# Patient Record
Sex: MALE | Race: WHITE | NOT HISPANIC OR LATINO | Employment: FULL TIME | URBAN - METROPOLITAN AREA
[De-identification: names, ages, dates, MRNs, and addresses within clinical notes are randomized per-mention and may not be internally consistent; named-entity substitution may affect disease eponyms.]

---

## 2018-04-07 ENCOUNTER — HOSPITAL ENCOUNTER (INPATIENT)
Facility: HOSPITAL | Age: 28
LOS: 2 days | Discharge: HOME | End: 2018-04-10
Attending: EMERGENCY MEDICINE | Admitting: ORTHOPAEDIC SURGERY

## 2018-04-07 DIAGNOSIS — S82.401A CLOSED FRACTURE OF RIGHT TIBIA AND FIBULA, INITIAL ENCOUNTER: Primary | ICD-10-CM

## 2018-04-07 DIAGNOSIS — S82.201A CLOSED FRACTURE OF RIGHT TIBIA AND FIBULA, INITIAL ENCOUNTER: Primary | ICD-10-CM

## 2018-04-07 PROCEDURE — 99284 EMERGENCY DEPT VISIT MOD MDM: CPT

## 2018-04-08 ENCOUNTER — APPOINTMENT (OUTPATIENT)
Dept: GENERAL RADIOLOGY | Facility: HOSPITAL | Age: 28
End: 2018-04-08

## 2018-04-08 ENCOUNTER — ANESTHESIA EVENT (OUTPATIENT)
Dept: PERIOP | Facility: HOSPITAL | Age: 28
End: 2018-04-08

## 2018-04-08 ENCOUNTER — ANESTHESIA (OUTPATIENT)
Dept: PERIOP | Facility: HOSPITAL | Age: 28
End: 2018-04-08

## 2018-04-08 ENCOUNTER — PREP FOR SURGERY (OUTPATIENT)
Dept: OTHER | Facility: HOSPITAL | Age: 28
End: 2018-04-08

## 2018-04-08 DIAGNOSIS — S82.201A CLOSED FRACTURE OF RIGHT TIBIA AND FIBULA, INITIAL ENCOUNTER: Primary | ICD-10-CM

## 2018-04-08 DIAGNOSIS — S82.401A CLOSED FRACTURE OF RIGHT TIBIA AND FIBULA, INITIAL ENCOUNTER: Primary | ICD-10-CM

## 2018-04-08 LAB
ANION GAP SERPL CALCULATED.3IONS-SCNC: 14.1 MMOL/L
BASOPHILS # BLD AUTO: 0.04 10*3/MM3 (ref 0–0.2)
BASOPHILS NFR BLD AUTO: 0.3 % (ref 0–2)
BUN BLD-MCNC: 12 MG/DL (ref 6–20)
BUN/CREAT SERPL: 12.2 (ref 7–25)
CALCIUM SPEC-SCNC: 9.3 MG/DL (ref 8.6–10.5)
CHLORIDE SERPL-SCNC: 103 MMOL/L (ref 98–107)
CO2 SERPL-SCNC: 25.9 MMOL/L (ref 22–29)
CREAT BLD-MCNC: 0.98 MG/DL (ref 0.76–1.27)
DEPRECATED RDW RBC AUTO: 37.3 FL (ref 37–54)
EOSINOPHIL # BLD AUTO: 0.01 10*3/MM3 (ref 0.1–0.3)
EOSINOPHIL NFR BLD AUTO: 0.1 % (ref 0–4)
ERYTHROCYTE [DISTWIDTH] IN BLOOD BY AUTOMATED COUNT: 12.8 % (ref 11.5–14.5)
ETHANOL BLD-MCNC: 199 MG/DL
ETHANOL UR QL: 0.2 %
GFR SERPL CREATININE-BSD FRML MDRD: 90 ML/MIN/1.73
GLUCOSE BLD-MCNC: 133 MG/DL (ref 65–99)
HCT VFR BLD AUTO: 47.1 % (ref 42–52)
HGB BLD-MCNC: 16.1 G/DL (ref 14–18)
IMM GRANULOCYTES # BLD: 0.04 10*3/MM3 (ref 0–0.03)
IMM GRANULOCYTES NFR BLD: 0.3 % (ref 0–0.5)
LYMPHOCYTES # BLD AUTO: 1.69 10*3/MM3 (ref 0.6–4.8)
LYMPHOCYTES NFR BLD AUTO: 14.5 % (ref 20–45)
MCH RBC QN AUTO: 27.8 PG (ref 27–31)
MCHC RBC AUTO-ENTMCNC: 34.2 G/DL (ref 31–37)
MCV RBC AUTO: 81.3 FL (ref 80–94)
MONOCYTES # BLD AUTO: 0.61 10*3/MM3 (ref 0–1)
MONOCYTES NFR BLD AUTO: 5.2 % (ref 3–8)
NEUTROPHILS # BLD AUTO: 9.27 10*3/MM3 (ref 1.5–8.3)
NEUTROPHILS NFR BLD AUTO: 79.6 % (ref 45–70)
NRBC BLD MANUAL-RTO: 0 /100 WBC (ref 0–0)
PLATELET # BLD AUTO: 268 10*3/MM3 (ref 140–500)
PMV BLD AUTO: 11.3 FL (ref 7.4–10.4)
POTASSIUM BLD-SCNC: 4.1 MMOL/L (ref 3.5–5.2)
RBC # BLD AUTO: 5.79 10*6/MM3 (ref 4.7–6.1)
SODIUM BLD-SCNC: 143 MMOL/L (ref 136–145)
WBC NRBC COR # BLD: 11.66 10*3/MM3 (ref 4.8–10.8)

## 2018-04-08 PROCEDURE — 80048 BASIC METABOLIC PNL TOTAL CA: CPT | Performed by: EMERGENCY MEDICINE

## 2018-04-08 PROCEDURE — 76000 FLUOROSCOPY <1 HR PHYS/QHP: CPT

## 2018-04-08 PROCEDURE — 85025 COMPLETE CBC W/AUTO DIFF WBC: CPT | Performed by: EMERGENCY MEDICINE

## 2018-04-08 PROCEDURE — 80307 DRUG TEST PRSMV CHEM ANLYZR: CPT | Performed by: EMERGENCY MEDICINE

## 2018-04-08 PROCEDURE — 25010000002 ONDANSETRON PER 1 MG

## 2018-04-08 PROCEDURE — 99284 EMERGENCY DEPT VISIT MOD MDM: CPT | Performed by: EMERGENCY MEDICINE

## 2018-04-08 PROCEDURE — 25010000002 ROPIVACAINE PER 1 MG: Performed by: NURSE ANESTHETIST, CERTIFIED REGISTERED

## 2018-04-08 PROCEDURE — 25010000002 VANCOMYCIN PER 500 MG: Performed by: ORTHOPAEDIC SURGERY

## 2018-04-08 PROCEDURE — 25010000002 FENTANYL CITRATE (PF) 100 MCG/2ML SOLUTION: Performed by: NURSE ANESTHETIST, CERTIFIED REGISTERED

## 2018-04-08 PROCEDURE — 73590 X-RAY EXAM OF LOWER LEG: CPT

## 2018-04-08 PROCEDURE — 27759 TREATMENT OF TIBIA FRACTURE: CPT | Performed by: ORTHOPAEDIC SURGERY

## 2018-04-08 PROCEDURE — C1713 ANCHOR/SCREW BN/BN,TIS/BN: HCPCS | Performed by: ORTHOPAEDIC SURGERY

## 2018-04-08 PROCEDURE — 0QSG06Z REPOSITION RIGHT TIBIA WITH INTRAMEDULLARY INTERNAL FIXATION DEVICE, OPEN APPROACH: ICD-10-PCS | Performed by: ORTHOPAEDIC SURGERY

## 2018-04-08 PROCEDURE — 25010000002 ONDANSETRON PER 1 MG: Performed by: NURSE ANESTHETIST, CERTIFIED REGISTERED

## 2018-04-08 PROCEDURE — 25010000002 MIDAZOLAM PER 1 MG: Performed by: NURSE ANESTHETIST, CERTIFIED REGISTERED

## 2018-04-08 PROCEDURE — 25010000002 DEXAMETHASONE PER 1 MG: Performed by: NURSE ANESTHETIST, CERTIFIED REGISTERED

## 2018-04-08 PROCEDURE — 25010000002 VANCOMYCIN 1500 MG/250ML SOLUTION: Performed by: ORTHOPAEDIC SURGERY

## 2018-04-08 PROCEDURE — 71045 X-RAY EXAM CHEST 1 VIEW: CPT

## 2018-04-08 PROCEDURE — 25010000002 HYDROMORPHONE PER 4 MG

## 2018-04-08 PROCEDURE — 25010000002 PROPOFOL 10 MG/ML EMULSION: Performed by: NURSE ANESTHETIST, CERTIFIED REGISTERED

## 2018-04-08 PROCEDURE — S0260 H&P FOR SURGERY: HCPCS | Performed by: ORTHOPAEDIC SURGERY

## 2018-04-08 PROCEDURE — 25010000002 HYDROMORPHONE PER 4 MG: Performed by: ORTHOPAEDIC SURGERY

## 2018-04-08 PROCEDURE — 94799 UNLISTED PULMONARY SVC/PX: CPT

## 2018-04-08 DEVICE — LOCKING SCREW, FULLY THREADED: Type: IMPLANTABLE DEVICE | Site: TIBIA | Status: FUNCTIONAL

## 2018-04-08 DEVICE — TIBIAL NAIL, STANDARD
Type: IMPLANTABLE DEVICE | Site: TIBIA | Status: FUNCTIONAL
Brand: T2

## 2018-04-08 DEVICE — END CAP
Type: IMPLANTABLE DEVICE | Site: TIBIA | Status: FUNCTIONAL
Brand: T2

## 2018-04-08 RX ORDER — MIDAZOLAM HYDROCHLORIDE 1 MG/ML
1 INJECTION INTRAMUSCULAR; INTRAVENOUS
Status: DISCONTINUED | OUTPATIENT
Start: 2018-04-08 | End: 2018-04-08 | Stop reason: HOSPADM

## 2018-04-08 RX ORDER — OXYCODONE HCL 10 MG/1
10 TABLET, FILM COATED, EXTENDED RELEASE ORAL ONCE
Status: CANCELLED | OUTPATIENT
Start: 2018-04-08 | End: 2018-04-08

## 2018-04-08 RX ORDER — FENTANYL CITRATE 50 UG/ML
INJECTION, SOLUTION INTRAMUSCULAR; INTRAVENOUS AS NEEDED
Status: DISCONTINUED | OUTPATIENT
Start: 2018-04-08 | End: 2018-04-08 | Stop reason: SURG

## 2018-04-08 RX ORDER — LIDOCAINE HYDROCHLORIDE 10 MG/ML
0.5 INJECTION, SOLUTION EPIDURAL; INFILTRATION; INTRACAUDAL; PERINEURAL ONCE AS NEEDED
Status: DISCONTINUED | OUTPATIENT
Start: 2018-04-08 | End: 2018-04-08 | Stop reason: HOSPADM

## 2018-04-08 RX ORDER — ACETAMINOPHEN 500 MG
1000 TABLET ORAL ONCE
Status: COMPLETED | OUTPATIENT
Start: 2018-04-08 | End: 2018-04-08

## 2018-04-08 RX ORDER — SODIUM CHLORIDE, SODIUM LACTATE, POTASSIUM CHLORIDE, CALCIUM CHLORIDE 600; 310; 30; 20 MG/100ML; MG/100ML; MG/100ML; MG/100ML
100 INJECTION, SOLUTION INTRAVENOUS CONTINUOUS
Status: DISCONTINUED | OUTPATIENT
Start: 2018-04-08 | End: 2018-04-10 | Stop reason: HOSPADM

## 2018-04-08 RX ORDER — ONDANSETRON 2 MG/ML
INJECTION INTRAMUSCULAR; INTRAVENOUS
Status: COMPLETED
Start: 2018-04-08 | End: 2018-04-08

## 2018-04-08 RX ORDER — ONDANSETRON 2 MG/ML
4 INJECTION INTRAMUSCULAR; INTRAVENOUS ONCE
Status: COMPLETED | OUTPATIENT
Start: 2018-04-08 | End: 2018-04-08

## 2018-04-08 RX ORDER — SODIUM CHLORIDE 0.9 % (FLUSH) 0.9 %
1-10 SYRINGE (ML) INJECTION AS NEEDED
Status: DISCONTINUED | OUTPATIENT
Start: 2018-04-08 | End: 2018-04-08 | Stop reason: HOSPADM

## 2018-04-08 RX ORDER — OXYCODONE HYDROCHLORIDE AND ACETAMINOPHEN 5; 325 MG/1; MG/1
1 TABLET ORAL ONCE AS NEEDED
Status: DISCONTINUED | OUTPATIENT
Start: 2018-04-08 | End: 2018-04-09

## 2018-04-08 RX ORDER — PREGABALIN 75 MG/1
75 CAPSULE ORAL EVERY 12 HOURS SCHEDULED
Status: DISCONTINUED | OUTPATIENT
Start: 2018-04-08 | End: 2018-04-09

## 2018-04-08 RX ORDER — ONDANSETRON 2 MG/ML
4 INJECTION INTRAMUSCULAR; INTRAVENOUS ONCE AS NEEDED
Status: DISCONTINUED | OUTPATIENT
Start: 2018-04-08 | End: 2018-04-10 | Stop reason: HOSPADM

## 2018-04-08 RX ORDER — VANCOMYCIN HCL-SODIUM CHLORIDE IV SOLN 1.5 GM/250ML-0.9% 1.5-0.9/25 GM/ML-%
1500 SOLUTION INTRAVENOUS ONCE
Status: CANCELLED | OUTPATIENT
Start: 2018-04-08 | End: 2018-04-08

## 2018-04-08 RX ORDER — ONDANSETRON 2 MG/ML
4 INJECTION INTRAMUSCULAR; INTRAVENOUS ONCE AS NEEDED
Status: COMPLETED | OUTPATIENT
Start: 2018-04-08 | End: 2018-04-08

## 2018-04-08 RX ORDER — PREGABALIN 75 MG/1
150 CAPSULE ORAL ONCE
Status: COMPLETED | OUTPATIENT
Start: 2018-04-08 | End: 2018-04-08

## 2018-04-08 RX ORDER — VANCOMYCIN HCL-SODIUM CHLORIDE IV SOLN 1.5 GM/250ML-0.9% 1.5-0.9/25 GM/ML-%
1500 SOLUTION INTRAVENOUS ONCE
Status: COMPLETED | OUTPATIENT
Start: 2018-04-08 | End: 2018-04-08

## 2018-04-08 RX ORDER — OXYCODONE AND ACETAMINOPHEN 10; 325 MG/1; MG/1
1 TABLET ORAL EVERY 4 HOURS PRN
Status: DISCONTINUED | OUTPATIENT
Start: 2018-04-08 | End: 2018-04-09

## 2018-04-08 RX ORDER — MIDAZOLAM HYDROCHLORIDE 1 MG/ML
2 INJECTION INTRAMUSCULAR; INTRAVENOUS
Status: DISCONTINUED | OUTPATIENT
Start: 2018-04-08 | End: 2018-04-08 | Stop reason: HOSPADM

## 2018-04-08 RX ORDER — SODIUM CHLORIDE 9 MG/ML
INJECTION, SOLUTION INTRAVENOUS AS NEEDED
Status: DISCONTINUED | OUTPATIENT
Start: 2018-04-08 | End: 2018-04-08 | Stop reason: HOSPADM

## 2018-04-08 RX ORDER — PREGABALIN 75 MG/1
150 CAPSULE ORAL ONCE
Status: CANCELLED | OUTPATIENT
Start: 2018-04-08 | End: 2018-04-08

## 2018-04-08 RX ORDER — SODIUM CHLORIDE, SODIUM LACTATE, POTASSIUM CHLORIDE, CALCIUM CHLORIDE 600; 310; 30; 20 MG/100ML; MG/100ML; MG/100ML; MG/100ML
30 INJECTION, SOLUTION INTRAVENOUS CONTINUOUS
Status: DISCONTINUED | OUTPATIENT
Start: 2018-04-08 | End: 2018-04-08

## 2018-04-08 RX ORDER — BUPIVACAINE HYDROCHLORIDE 5 MG/ML
INJECTION, SOLUTION EPIDURAL; INTRACAUDAL AS NEEDED
Status: DISCONTINUED | OUTPATIENT
Start: 2018-04-08 | End: 2018-04-08 | Stop reason: SURG

## 2018-04-08 RX ORDER — HYDROMORPHONE HCL 110MG/55ML
1 PATIENT CONTROLLED ANALGESIA SYRINGE INTRAVENOUS
Status: DISCONTINUED | OUTPATIENT
Start: 2018-04-08 | End: 2018-04-10 | Stop reason: HOSPADM

## 2018-04-08 RX ORDER — ROPIVACAINE HYDROCHLORIDE 5 MG/ML
INJECTION, SOLUTION EPIDURAL; INFILTRATION; PERINEURAL AS NEEDED
Status: DISCONTINUED | OUTPATIENT
Start: 2018-04-08 | End: 2018-04-08 | Stop reason: SURG

## 2018-04-08 RX ORDER — DEXAMETHASONE SODIUM PHOSPHATE 4 MG/ML
8 INJECTION, SOLUTION INTRA-ARTICULAR; INTRALESIONAL; INTRAMUSCULAR; INTRAVENOUS; SOFT TISSUE ONCE AS NEEDED
Status: COMPLETED | OUTPATIENT
Start: 2018-04-08 | End: 2018-04-08

## 2018-04-08 RX ORDER — OXYCODONE HCL 10 MG/1
10 TABLET, FILM COATED, EXTENDED RELEASE ORAL ONCE
Status: DISCONTINUED | OUTPATIENT
Start: 2018-04-08 | End: 2018-04-08

## 2018-04-08 RX ORDER — FAMOTIDINE 10 MG/ML
20 INJECTION, SOLUTION INTRAVENOUS
Status: DISCONTINUED | OUTPATIENT
Start: 2018-04-08 | End: 2018-04-08 | Stop reason: HOSPADM

## 2018-04-08 RX ORDER — MEPERIDINE HYDROCHLORIDE 25 MG/ML
12.5 INJECTION INTRAMUSCULAR; INTRAVENOUS; SUBCUTANEOUS
Status: ACTIVE | OUTPATIENT
Start: 2018-04-08 | End: 2018-04-09

## 2018-04-08 RX ORDER — ACETAMINOPHEN 500 MG
1000 TABLET ORAL ONCE
Status: CANCELLED | OUTPATIENT
Start: 2018-04-08 | End: 2018-04-08

## 2018-04-08 RX ORDER — PROPOFOL 10 MG/ML
VIAL (ML) INTRAVENOUS CONTINUOUS PRN
Status: DISCONTINUED | OUTPATIENT
Start: 2018-04-08 | End: 2018-04-08 | Stop reason: SURG

## 2018-04-08 RX ORDER — SODIUM CHLORIDE 9 MG/ML
40 INJECTION, SOLUTION INTRAVENOUS AS NEEDED
Status: DISCONTINUED | OUTPATIENT
Start: 2018-04-08 | End: 2018-04-08 | Stop reason: HOSPADM

## 2018-04-08 RX ORDER — VANCOMYCIN HYDROCHLORIDE 500 MG/10ML
INJECTION, POWDER, LYOPHILIZED, FOR SOLUTION INTRAVENOUS AS NEEDED
Status: DISCONTINUED | OUTPATIENT
Start: 2018-04-08 | End: 2018-04-08 | Stop reason: HOSPADM

## 2018-04-08 RX ORDER — MAGNESIUM HYDROXIDE 1200 MG/15ML
LIQUID ORAL AS NEEDED
Status: DISCONTINUED | OUTPATIENT
Start: 2018-04-08 | End: 2018-04-08 | Stop reason: HOSPADM

## 2018-04-08 RX ORDER — LIDOCAINE HYDROCHLORIDE 20 MG/ML
INJECTION, SOLUTION INFILTRATION; PERINEURAL AS NEEDED
Status: DISCONTINUED | OUTPATIENT
Start: 2018-04-08 | End: 2018-04-08 | Stop reason: SURG

## 2018-04-08 RX ORDER — SODIUM CHLORIDE, SODIUM LACTATE, POTASSIUM CHLORIDE, CALCIUM CHLORIDE 600; 310; 30; 20 MG/100ML; MG/100ML; MG/100ML; MG/100ML
9 INJECTION, SOLUTION INTRAVENOUS CONTINUOUS
Status: DISCONTINUED | OUTPATIENT
Start: 2018-04-08 | End: 2018-04-08

## 2018-04-08 RX ADMIN — SODIUM CHLORIDE, POTASSIUM CHLORIDE, SODIUM LACTATE AND CALCIUM CHLORIDE 100 ML/HR: 600; 310; 30; 20 INJECTION, SOLUTION INTRAVENOUS at 13:11

## 2018-04-08 RX ADMIN — HYDROMORPHONE HYDROCHLORIDE 1 MG: 2 INJECTION INTRAMUSCULAR; INTRAVENOUS; SUBCUTANEOUS at 21:27

## 2018-04-08 RX ADMIN — SODIUM CHLORIDE, POTASSIUM CHLORIDE, SODIUM LACTATE AND CALCIUM CHLORIDE 9 ML/HR: 600; 310; 30; 20 INJECTION, SOLUTION INTRAVENOUS at 09:45

## 2018-04-08 RX ADMIN — SODIUM CHLORIDE, POTASSIUM CHLORIDE, SODIUM LACTATE AND CALCIUM CHLORIDE: 600; 310; 30; 20 INJECTION, SOLUTION INTRAVENOUS at 09:59

## 2018-04-08 RX ADMIN — SODIUM CHLORIDE, POTASSIUM CHLORIDE, SODIUM LACTATE AND CALCIUM CHLORIDE 100 ML/HR: 600; 310; 30; 20 INJECTION, SOLUTION INTRAVENOUS at 21:32

## 2018-04-08 RX ADMIN — HYDROMORPHONE HYDROCHLORIDE 1 MG: 2 INJECTION INTRAMUSCULAR; INTRAVENOUS; SUBCUTANEOUS at 09:37

## 2018-04-08 RX ADMIN — BUPIVACAINE HYDROCHLORIDE 3 MG: 5 INJECTION, SOLUTION EPIDURAL; INTRACAUDAL; PERINEURAL at 10:30

## 2018-04-08 RX ADMIN — PREGABALIN 75 MG: 75 CAPSULE ORAL at 20:17

## 2018-04-08 RX ADMIN — HYDROMORPHONE HYDROCHLORIDE 1 MG: 2 INJECTION INTRAMUSCULAR; INTRAVENOUS; SUBCUTANEOUS at 06:19

## 2018-04-08 RX ADMIN — SODIUM CHLORIDE, POTASSIUM CHLORIDE, SODIUM LACTATE AND CALCIUM CHLORIDE 30 ML/HR: 600; 310; 30; 20 INJECTION, SOLUTION INTRAVENOUS at 02:33

## 2018-04-08 RX ADMIN — MIDAZOLAM HYDROCHLORIDE 2 MG: 1 INJECTION, SOLUTION INTRAMUSCULAR; INTRAVENOUS at 10:25

## 2018-04-08 RX ADMIN — HYDROMORPHONE HYDROCHLORIDE 1 MG: 2 INJECTION INTRAMUSCULAR; INTRAVENOUS; SUBCUTANEOUS at 02:31

## 2018-04-08 RX ADMIN — FENTANYL CITRATE 50 MCG: 50 INJECTION, SOLUTION INTRAMUSCULAR; INTRAVENOUS at 10:30

## 2018-04-08 RX ADMIN — HYDROMORPHONE HYDROCHLORIDE 1 MG: 1 INJECTION, SOLUTION INTRAMUSCULAR; INTRAVENOUS; SUBCUTANEOUS at 01:13

## 2018-04-08 RX ADMIN — MIDAZOLAM HYDROCHLORIDE 2 MG: 1 INJECTION, SOLUTION INTRAMUSCULAR; INTRAVENOUS at 10:18

## 2018-04-08 RX ADMIN — PREGABALIN 150 MG: 75 CAPSULE ORAL at 10:01

## 2018-04-08 RX ADMIN — PROPOFOL 100 MCG/KG/MIN: 10 INJECTION, EMULSION INTRAVENOUS at 10:44

## 2018-04-08 RX ADMIN — OXYCODONE AND ACETAMINOPHEN 1 TABLET: 10; 325 TABLET ORAL at 15:12

## 2018-04-08 RX ADMIN — VANCOMYCIN HCL-SODIUM CHLORIDE IV SOLN 1.5 GM/250ML-0.9% 1500 MG: 1.5-0.9/25 SOLUTION at 09:45

## 2018-04-08 RX ADMIN — Medication 1 MG: at 01:13

## 2018-04-08 RX ADMIN — PREGABALIN 75 MG: 75 CAPSULE ORAL at 15:01

## 2018-04-08 RX ADMIN — HYDROMORPHONE HYDROCHLORIDE 1 MG: 2 INJECTION INTRAMUSCULAR; INTRAVENOUS; SUBCUTANEOUS at 18:36

## 2018-04-08 RX ADMIN — ROPIVACAINE HYDROCHLORIDE 1000 MG: 5 INJECTION, SOLUTION EPIDURAL; INFILTRATION; PERINEURAL at 10:20

## 2018-04-08 RX ADMIN — LIDOCAINE HYDROCHLORIDE 100 MG: 20 INJECTION, SOLUTION INFILTRATION; PERINEURAL at 10:44

## 2018-04-08 RX ADMIN — ONDANSETRON 4 MG: 2 SOLUTION INTRAMUSCULAR; INTRAVENOUS at 01:13

## 2018-04-08 RX ADMIN — ACETAMINOPHEN 1000 MG: 500 TABLET, FILM COATED ORAL at 10:02

## 2018-04-08 RX ADMIN — ONDANSETRON 4 MG: 2 INJECTION, SOLUTION INTRAMUSCULAR; INTRAVENOUS at 11:16

## 2018-04-08 RX ADMIN — ONDANSETRON 4 MG: 2 INJECTION INTRAMUSCULAR; INTRAVENOUS at 01:13

## 2018-04-08 RX ADMIN — DEXAMETHASONE SODIUM PHOSPHATE 8 MG: 4 INJECTION, SOLUTION INTRAMUSCULAR; INTRAVENOUS at 10:16

## 2018-04-08 NOTE — PLAN OF CARE
Problem: Patient Care Overview  Goal: Discharge Needs Assessment  Outcome: Ongoing (interventions implemented as appropriate)   04/08/18 0158 04/08/18 1110 04/08/18 1115   Discharge Needs Assessment   Readmission Within the Last 30 Days --  no previous admission in last 30 days --    Concerns to be Addressed --  discharge planning --    Patient/Family Anticipates Transition to --  home with family --    Patient/Family Anticipated Services at Transition none --  --    Transportation Anticipated --  family or friend will provide --    Anticipated Changes Related to Illness --  (He has some concerns about his job and being non weight-bearing.) --    Equipment Needed After Discharge --  crutches --    Discharge Coordination/Progress --  --  Spoke with Mr Amaya and his wife (with permission) at bedside. They live in a house in [place]. He has not had home health in the past. His wife states they have crutches at home. Prior to this injury he was independent with his ADL's and drives himself. His pharmacy is [facility] in [place]. There are no financial issues with payinf for his prescriptions. He does not have an advanced directive. Facesheet verified. Will continue to follow. Plan is home when stable.   Disability   Equipment Currently Used at Home --  none --
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
## 2018-04-08 NOTE — PLAN OF CARE
Problem: Patient Care Overview  Goal: Plan of Care Review  Outcome: Ongoing (interventions implemented as appropriate)   04/08/18 0323   Coping/Psychosocial   Plan of Care Reviewed With patient   OTHER   Outcome Summary Pt. complains of pain - managed w/pain medication; RLE elevated w/ice; no falls noted this shift; NPO; surgical consent signed & in chart; IVF infusing     Goal: Individualization and Mutuality  Outcome: Ongoing (interventions implemented as appropriate)    Goal: Discharge Needs Assessment  Outcome: Ongoing (interventions implemented as appropriate)      Problem: Fall Risk (Adult)  Goal: Identify Related Risk Factors and Signs and Symptoms  Outcome: Ongoing (interventions implemented as appropriate)    Goal: Absence of Fall  Outcome: Ongoing (interventions implemented as appropriate)      Problem: Skin Injury Risk (Adult)  Goal: Identify Related Risk Factors and Signs and Symptoms  Outcome: Ongoing (interventions implemented as appropriate)    Goal: Skin Health and Integrity  Outcome: Ongoing (interventions implemented as appropriate)

## 2018-04-08 NOTE — ANESTHESIA PREPROCEDURE EVALUATION
Anesthesia Evaluation     Patient summary reviewed and Nursing notes reviewed   no history of anesthetic complications:  NPO Solid Status: > 8 hours  NPO Liquid Status: > 8 hours           Airway   Mallampati: II  TM distance: >3 FB  Neck ROM: full  No difficulty expected  Dental - normal exam     Pulmonary - negative pulmonary ROS and normal exam    breath sounds clear to auscultation  Cardiovascular - negative cardio ROS and normal exam  Exercise tolerance: excellent (>7 METS)    Rhythm: regular  Rate: normal        Neuro/Psych- negative ROS  GI/Hepatic/Renal/Endo - negative ROS     Musculoskeletal (-) negative ROS    Abdominal    Substance History   (+) alcohol use ( occasional),      OB/GYN negative ob/gyn ROS         Other                      Anesthesia Plan    ASA 1 - emergent     MAC, spinal and regional     intravenous induction   Anesthetic plan and risks discussed with patient.  Use of blood products discussed with patient  Consented to blood products.

## 2018-04-08 NOTE — ED NOTES
Pt and family know that he is to have nothing to eat or drink.     Dusty Harrison RN  04/08/18 0123
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
## 2018-04-08 NOTE — ANESTHESIA PROCEDURE NOTES
Spinal Block    Patient location during procedure: pre-op  Start Time: 4/8/2018 10:25 AM  Stop Time: 4/8/2018 10:30 AM  Indication:at surgeon's request and procedure for pain  Performed By  CRNA: RYNE ENCINAS  Preanesthetic Checklist  Completed: patient identified, site marked, surgical consent, pre-op evaluation, timeout performed, IV checked, risks and benefits discussed and monitors and equipment checked  Spinal Block Prep:  Patient Position:left lateral decubitus  Sterile Tech:cap, gloves, mask and sterile barriers  Prep:Betadine  Patient Monitoring:blood pressure monitoring, continuous pulse oximetry and EKG  Spinal Block Procedure  Approach:midline  Guidance:landmark technique and palpation technique  Location:L3-L4  Needle Type:Sprotte  Needle Gauge:24 G  Placement of Spinal needle event:cerebrospinal fluid aspirated  Paresthesia: no  Fluid Appearance:clear  Post Assessment  Patient Tolerance:patient tolerated the procedure well with no apparent complications  Complications no

## 2018-04-08 NOTE — PLAN OF CARE
Problem: Patient Care Overview  Goal: Plan of Care Review  Outcome: Ongoing (interventions implemented as appropriate)    Goal: Individualization and Mutuality  Outcome: Ongoing (interventions implemented as appropriate)    Goal: Discharge Needs Assessment  Outcome: Ongoing (interventions implemented as appropriate)    Goal: Interprofessional Rounds/Family Conf  Outcome: Ongoing (interventions implemented as appropriate)      Problem: Fall Risk (Adult)  Goal: Identify Related Risk Factors and Signs and Symptoms  Outcome: Outcome(s) achieved Date Met: 04/08/18    Goal: Absence of Fall  Outcome: Ongoing (interventions implemented as appropriate)      Problem: Skin Injury Risk (Adult)  Goal: Identify Related Risk Factors and Signs and Symptoms  Outcome: Outcome(s) achieved Date Met: 04/08/18    Goal: Skin Health and Integrity  Outcome: Ongoing (interventions implemented as appropriate)

## 2018-04-08 NOTE — ANESTHESIA PROCEDURE NOTES
Peripheral Block    Patient location during procedure: pre-op  Start time: 4/8/2018 10:17 AM  Stop time: 4/8/2018 10:21 AM  Reason for block: at surgeon's request and post-op pain management  Performed by  CRNA: RYNE ENCINAS  Preanesthetic Checklist  Completed: patient identified, site marked, surgical consent, pre-op evaluation, timeout performed, IV checked, risks and benefits discussed and monitors and equipment checked  Procedure  Sedation:yes  Performed under: PNB  Guidance:ultrasound guided  ULTRASOUND INTERPRETATION. Using ultrasound guidance a gauge needle was placed in close proximity to the femoral nerve, at which point, under ultrasound guidance anesthetic was injected in the area of the nerve and spread of the anesthesia was seen on ultrasound in close proximity thereto.  There were no abnormalities seen on ultrasound; a digital image was taken; and the patient tolerated the procedure with no complications. Images:still images obtained    Laterality:right  Block Type:adductor canal block  Injection Technique:single-shot  Needle Type:echogenic  Needle Gauge:22 G  Resistance on Injection: none  Medications  Local Injected:ropivacaine 0.5% Local Amount Injected:20mL  Post Assessment  Injection Assessment: negative aspiration for heme, no paresthesia on injection and incremental injection  Patient Tolerance:comfortable throughout block  Complications:no

## 2018-04-08 NOTE — NURSING NOTE
Discharge Planning Assessment   Lianne Epps     Patient Name: Derick Amaya  MRN: [id number]  Today's Date: 4/8/2018    Admit Date: 4/7/2018          Discharge Needs Assessment     Row Name 04/08/18 1115       Discharge Needs Assessment    Discharge Coordination/Progress Spoke with Mr Amaya and his wife (with permission) at bedside.  They live in a house in [place].  He has not had home health in the past.  His wife states they have crutches at home.  Prior to this injury he was independent with his ADL's and drives himself.  His pharmacy is [facility] in [place].  There are no financial issues with payinf for his prescriptions.  He does not have an advanced directive.  Facesheet verified.  Will continue to follow.  Plan is home when stable.    Row Name 04/08/18 1110       Living Environment    Lives With spouse    Name(s) of Who Lives With Patient Anika Amaya    Current Living Arrangements home/apartment/condo    Primary Care Provided by self    Provides Primary Care For no one    Family Caregiver if Needed spouse    Quality of Family Relationships helpful;involved;supportive    Able to Return to Prior Arrangements yes       Resource/Environmental Concerns    Resource/Environmental Concerns none       Transition Planning    Patient/Family Anticipates Transition to home with family    Transportation Anticipated family or friend will provide       Discharge Needs Assessment    Readmission Within the Last 30 Days no previous admission in last 30 days    Concerns to be Addressed discharge planning    Equipment Currently Used at Home none    Anticipated Changes Related to Illness --   He has some concerns about his job and being non weight-bearing.    Equipment Needed After Discharge crutches            Discharge Plan     Row Name 04/08/18 1120       Plan    Plan Comments Spoke with Mr Amaya and his wife (with permission) at bedside.  They live in a house in [place].  He has not had home health in the  past.  His wife states they have crutches at home.  Prior to this injury he was independent with his ADL's and drives himself.  His pharmacy is [facility] in [place].  There are no financial issues with payinf for his prescriptions.  He does not have an advanced directive.  Facesheet verified.  Will continue to follow.  Plan is home when stable.        Destination     No service coordination in this encounter.      Durable Medical Equipment     No service coordination in this encounter.      Dialysis/Infusion     No service coordination in this encounter.      Home Medical Care     No service coordination in this encounter.      Social Care     No service coordination in this encounter.                Demographic Summary     Row Name 04/08/18 1110       General Information    Arrived From home    Referral Source admission list    Preferred Language English     Used During This Interaction no       Contact Information    Permission Granted to Share Info With             Functional Status    No documentation.           Psychosocial    No documentation.           Abuse/Neglect    No documentation.           Legal    No documentation.           Substance Abuse    No documentation.           Patient Forms    No documentation.         Aissatou Stock RN

## 2018-04-08 NOTE — H&P
"Orthopedic Surgery    Patient Care Team:  MICHAEL Flores as PCP - General (Physician Assistant)    CHIEF COMPLAINT: Right leg pain    HISTORY OF PRESENT ILLNESS: 27-year-old male is admitted to the emergency room last night after having fallen at a concert injuring his right leg when someone fell on him.  Patient is a little nebulous as to the specifics of the injury and presented to the emergency room with severe pain involving the right leg.  He does report drinking beer and hard liquor low doesn't recall the amount.  X-rays done on admission shows a fracture of the right proximal fibula and oblique fracture of the distal one third of the tibia with displacement.  No other apparent injuries as result of this fall.  Patient admitted this time for definitive care.          History reviewed. No pertinent past medical history.  Past Surgical History:   Procedure Laterality Date   • APPENDECTOMY     • VASECTOMY       Family History   Problem Relation Age of Onset   • No Known Problems Mother    • No Known Problems Father      Social History   Substance Use Topics   • Smoking status: Never Smoker   • Smokeless tobacco: Never Used   • Alcohol use Yes      Comment: social drinker     No prescriptions prior to admission.     Allergies:  Augmentin [amoxicillin-pot clavulanate]    REVIEW OF SYSTEMS:  Please see the above history of present illness for pertinent positives and negatives.  The remainder of the patient's systems have been reviewed and are negative.    Vital Signs  Temp:  [98.1 °F (36.7 °C)-99.1 °F (37.3 °C)] 99.1 °F (37.3 °C)  Heart Rate:  [] 83  Resp:  [16-18] 18  BP: (106-138)/(68-85) 106/68    Flowsheet Rows    Flowsheet Row First Filed Value   Admission Height 175.3 cm (69\") Documented at 04/07/2018 2355   Admission Weight 95.3 kg (210 lb) Documented at 04/07/2018 2355           Physical Exam:  Physical Exam   Constitutional: Patient appears well-developed and well-nourished and in no acute " distress   HEENT:   Head: Normocephalic and atraumatic.   Eyes:  Pupils are equal, round, and reactive to light.  Mouth and Throat: Patient has moist mucous membranes. Oropharynx is clear of any erythema or exudate.     Neck: Neck supple. No JVD present. No thyromegaly present. No lymphadenopathy present.  Cardiovascular: Regular rate, regular rhythm.  Pulmonary/Chest: Lungs are clear to auscultation bilaterally.  Abdominal:benign,soft with bowel sounds  Musculoskeletal: Normal posture.  Extremities: The right leg is in a short-leg splint.  He's having moderate pain as expected.  His no sensory loss in his good capillary refill.    Neurological: Patient is alert and oriented.  Psychological:   Mood and behavior appropriate.  Skin: Skin is warm and dry.     Results Review:    I reviewed the patient's new clinical results.  Lab Results (most recent)     Procedure Component Value Units Date/Time    Basic Metabolic Panel [[id number]]  (Abnormal) Collected:  04/08/18 0108    Specimen:  Blood Updated:  04/08/18 0127     Glucose 133 (H) mg/dL      BUN 12 mg/dL      Creatinine 0.98 mg/dL      Sodium 143 mmol/L      Potassium 4.1 mmol/L      Chloride 103 mmol/L      CO2 25.9 mmol/L      Calcium 9.3 mg/dL      eGFR Non African Amer 90 mL/min/1.73      BUN/Creatinine Ratio 12.2     Anion Gap 14.1 mmol/L     Narrative:       GFR Normal >60  Chronic Kidney Disease <60  Kidney Failure <15    Ethanol [[id number]] Collected:  04/08/18 0108    Specimen:  Blood Updated:  04/08/18 0127     Ethanol 199 mg/dL      Ethanol % 0.199 %     CBC & Differential [[id number]] Collected:  04/08/18 0108    Specimen:  Blood Updated:  04/08/18 0112    Narrative:       The following orders were created for panel order CBC & Differential.  Procedure                               Abnormality         Status                     ---------                               -----------         ------                     CBC Auto Differential[[id number]]          Abnormal            Final result                 Please view results for these tests on the individual orders.    CBC Auto Differential [[id number]]  (Abnormal) Collected:  04/08/18 0108    Specimen:  Blood Updated:  04/08/18 0112     WBC 11.66 (H) 10*3/mm3      RBC 5.79 10*6/mm3      Hemoglobin 16.1 g/dL      Hematocrit 47.1 %      MCV 81.3 fL      MCH 27.8 pg      MCHC 34.2 g/dL      RDW 12.8 %      RDW-SD 37.3 fl      MPV 11.3 (H) fL      Platelets 268 10*3/mm3      Neutrophil % 79.6 (H) %      Lymphocyte % 14.5 (L) %      Monocyte % 5.2 %      Eosinophil % 0.1 %      Basophil % 0.3 %      Immature Grans % 0.3 %      Neutrophils, Absolute 9.27 (H) 10*3/mm3      Lymphocytes, Absolute 1.69 10*3/mm3      Monocytes, Absolute 0.61 10*3/mm3      Eosinophils, Absolute 0.01 (L) 10*3/mm3      Basophils, Absolute 0.04 10*3/mm3      Immature Grans, Absolute 0.04 (H) 10*3/mm3      nRBC 0.0 /100 WBC           Imaging Results (most recent)     Procedure Component Value Units Date/Time    XR Chest 1 View [[id number]] Collected:  04/08/18 0731     Updated:  04/08/18 0734    Narrative:       EXAM: Portable chest radiograph     HISTORY: Preop exam prior to ORIF right tibial and fibular fractures     TECHNIQUE: Single view portable chest radiograph      COMPARISON: None     FINDINGS: Allowing for apparent submaximal inspiration, there is no  infiltrate, effusion, pneumothorax or suspicious nodule. Heart size and  pulmonary vascularity and bony structures are normal.       Impression:       Submaximal inspiration, likely negative portable chest  radiograph.     This report was finalized on 4/8/2018 7:32 AM by Dr. Marcio Pompa MD.       XR Tibia Fibula 2 View Right [[id number]] Collected:  04/08/18 0729     Updated:  04/08/18 0733    Narrative:       INDICATION: Acute right leg pain after fall immediately prior to  arrival.     COMPARISON: None available.     FINDINGS: 5 views of the right tibia/fibula.  There is no oblique  distal  tibial fracture a few centimeters above the plafond, and an oblique  fracture of the fibular neck.       No bone erosion or destruction. Articulation at the knee and ankle is  anatomic..  No foreign body.       Impression:       Oblique distal tibial and proximal fibular fractures mildly to  moderately displaced.     This report was finalized on 4/8/2018 7:31 AM by Dr. Marcio Pompa MD.                 ECG/EMG Results (most recent)     None              Assessment/Plan x-ray shows a minimally to nondisplaced right proximal fibular fracture and a displaced right distal one third tibia fracture        I discussed the patients findings and my recommendations with patient and reviewed the x-rays with the patient.  Discussed surgical intervention with intramedullary nailing.  Reviewed the risk of surgery which include but not limited to infection with the need for multiple procedures including implant removal to eradicate infection, DVT, nerve injury, vascular injury, delayed and nonunion and the need for further surgery, persistent pain and discomfort after well healed fracture.  Discussed the length the rehabilitation which would be 3-5 months.  Patient understands and agrees to proceed with the procedure    Daljit Blanca MD  04/08/18  8:59 AM

## 2018-04-08 NOTE — ANESTHESIA POSTPROCEDURE EVALUATION
Patient: Derick Amaya    Procedure Summary     Date:  04/08/18 Room / Location:   [facility] OR 2 / [facility]    Anesthesia Start:  1037 Anesthesia Stop:  1247    Procedure:  TIBIA INTRAMEDULLARY NAIL/RAKESH INSERTION-alfonso (Right Leg Lower) Diagnosis:       Closed fracture of right tibia and fibula, initial encounter      (Closed fracture of right tibia and fibula, initial encounter [S82.201A, S82.401A])    Surgeon:  Daljit Blanca MD Provider:  Sarah Pringle CRNA    Anesthesia Type:  MAC, spinal, regional ASA Status:  1 - Emergent          Anesthesia Type: MAC, spinal, regional  Last vitals  BP   126/79 (04/08/18 1320)   Temp   97.5 °F (36.4 °C) (04/08/18 1320)   Pulse   100 (04/08/18 1320)   Resp   14 (04/08/18 1320)     SpO2   96 % (04/08/18 1320)     Post Anesthesia Care and Evaluation    Patient location during evaluation: PACU  Patient participation: complete - patient participated  Level of consciousness: awake  Pain score: 0  Pain management: adequate  Airway patency: patent  PONV Status: none  Cardiovascular status: acceptable  Respiratory status: acceptable  Hydration status: acceptable

## 2018-04-09 LAB
HCT VFR BLD AUTO: 39.1 % (ref 42–52)
HGB BLD-MCNC: 13.1 G/DL (ref 14–18)

## 2018-04-09 PROCEDURE — 97165 OT EVAL LOW COMPLEX 30 MIN: CPT

## 2018-04-09 PROCEDURE — 25010000002 HYDROMORPHONE PER 4 MG: Performed by: ORTHOPAEDIC SURGERY

## 2018-04-09 PROCEDURE — 25010000002 ENOXAPARIN PER 10 MG: Performed by: ORTHOPAEDIC SURGERY

## 2018-04-09 PROCEDURE — 85018 HEMOGLOBIN: CPT | Performed by: ORTHOPAEDIC SURGERY

## 2018-04-09 PROCEDURE — 97161 PT EVAL LOW COMPLEX 20 MIN: CPT

## 2018-04-09 PROCEDURE — 25010000002 VANCOMYCIN: Performed by: ORTHOPAEDIC SURGERY

## 2018-04-09 PROCEDURE — 94799 UNLISTED PULMONARY SVC/PX: CPT

## 2018-04-09 PROCEDURE — 99024 POSTOP FOLLOW-UP VISIT: CPT | Performed by: ORTHOPAEDIC SURGERY

## 2018-04-09 PROCEDURE — 85014 HEMATOCRIT: CPT | Performed by: ORTHOPAEDIC SURGERY

## 2018-04-09 RX ORDER — PREGABALIN 75 MG/1
150 CAPSULE ORAL EVERY 12 HOURS SCHEDULED
Status: DISCONTINUED | OUTPATIENT
Start: 2018-04-09 | End: 2018-04-10 | Stop reason: HOSPADM

## 2018-04-09 RX ORDER — OXYCODONE AND ACETAMINOPHEN 10; 325 MG/1; MG/1
2 TABLET ORAL EVERY 4 HOURS PRN
Status: DISCONTINUED | OUTPATIENT
Start: 2018-04-09 | End: 2018-04-10 | Stop reason: HOSPADM

## 2018-04-09 RX ADMIN — PREGABALIN 150 MG: 75 CAPSULE ORAL at 08:32

## 2018-04-09 RX ADMIN — PREGABALIN 150 MG: 75 CAPSULE ORAL at 22:42

## 2018-04-09 RX ADMIN — OXYCODONE AND ACETAMINOPHEN 2 TABLET: 10; 325 TABLET ORAL at 15:55

## 2018-04-09 RX ADMIN — ENOXAPARIN SODIUM 40 MG: 100 INJECTION SUBCUTANEOUS at 08:32

## 2018-04-09 RX ADMIN — HYDROMORPHONE HYDROCHLORIDE 1 MG: 2 INJECTION INTRAMUSCULAR; INTRAVENOUS; SUBCUTANEOUS at 12:55

## 2018-04-09 RX ADMIN — OXYCODONE AND ACETAMINOPHEN 2 TABLET: 10; 325 TABLET ORAL at 07:40

## 2018-04-09 RX ADMIN — OXYCODONE AND ACETAMINOPHEN 1 TABLET: 10; 325 TABLET ORAL at 01:15

## 2018-04-09 RX ADMIN — OXYCODONE AND ACETAMINOPHEN 2 TABLET: 10; 325 TABLET ORAL at 11:53

## 2018-04-09 RX ADMIN — OXYCODONE AND ACETAMINOPHEN 2 TABLET: 10; 325 TABLET ORAL at 19:52

## 2018-04-09 RX ADMIN — HYDROMORPHONE HYDROCHLORIDE 1 MG: 2 INJECTION INTRAMUSCULAR; INTRAVENOUS; SUBCUTANEOUS at 23:03

## 2018-04-09 RX ADMIN — VANCOMYCIN HYDROCHLORIDE 1500 MG: 750 INJECTION, POWDER, LYOPHILIZED, FOR SOLUTION INTRAVENOUS at 01:07

## 2018-04-09 NOTE — PROGRESS NOTES
Orthopedic Progress Note   Chief Complaint:  Status post IM rodding right tibia fracture    Subjective     Interval History: Patient is postop day 1.  Is afebrile hemodynamically stable.  Hemoglobin is 13.1.  To 270 cc a Hemovac drainage.  Moderate pain marginally controlled with medication          Objective     Vital Signs  Temp:  [97.5 °F (36.4 °C)-99.3 °F (37.4 °C)] 98.1 °F (36.7 °C)  Heart Rate:  [] 88  Resp:  [10-16] 16  BP: (119-154)/(68-87) 134/71  Body mass index is 31.29 kg/m².    Intake/Output Summary (Last 24 hours) at 04/09/18 0727  Last data filed at 04/08/18 2016   Gross per 24 hour   Intake             2090 ml   Output             1320 ml   Net              770 ml     No intake/output data recorded.       Physical Exam:   General: patient awake, alert and cooperative   Cardiovascular: regular rhythm and rate   Pulm: clear to auscultation bilaterally   Abdomen: Benign.  Soft bowel sounds   Extremities: Right leg is good distal pulses no motor deficit no sensory loss   Neurologic: Normal mood and behavior     Results Review:     I reviewed the patient's new clinical results.      WBC No results found for: WBCS   HGB Hemoglobin   Date Value Ref Range Status   04/09/2018 13.1 (L) 14.0 - 18.0 g/dL Final   04/08/2018 16.1 14.0 - 18.0 g/dL Final      HCT Hematocrit   Date Value Ref Range Status   04/09/2018 39.1 (L) 42.0 - 52.0 % Final   04/08/2018 47.1 42.0 - 52.0 % Final      Platlets No results found for: LABPLAT     PT/INR:  No results found for: PROTIME/No results found for: INR    Sodium Sodium   Date Value Ref Range Status   04/08/2018 143 136 - 145 mmol/L Final      Potassium Potassium   Date Value Ref Range Status   04/08/2018 4.1 3.5 - 5.2 mmol/L Final      Chloride Chloride   Date Value Ref Range Status   04/08/2018 103 98 - 107 mmol/L Final      Bicarbonate No results found for: PLASMABICARB   BUN BUN   Date Value Ref Range Status   04/08/2018 12 6 - 20 mg/dL Final      Creatinine  Creatinine   Date Value Ref Range Status   04/08/2018 0.98 0.76 - 1.27 mg/dL Final      Calcium Calcium   Date Value Ref Range Status   04/08/2018 9.3 8.6 - 10.5 mg/dL Final      Magnesium  AST  ALT  Bilirubin, Total  AlkPhos  Albumin    Amylase  Lipase    Radiology: No results found for: MG  No components found for: AST.*  No components found for: ALT.*  No components found for: BILIRUBIN, TOTAL.*    No components found for: ALKPHOS.*  No components found for: ALBUMIN.*      No components found for: AMYLASE.*  No components found for: LIPASE.*            Imaging Results (most recent)     Procedure Component Value Units Date/Time    FL C Arm During Surgery [[id number]] Updated:  04/08/18 2056    XR Tibia Fibula 2 View Right [[id number]] Updated:  04/08/18 1549    XR Chest 1 View [[id number]] Collected:  04/08/18 0731     Updated:  04/08/18 0734    Narrative:       EXAM: Portable chest radiograph     HISTORY: Preop exam prior to ORIF right tibial and fibular fractures     TECHNIQUE: Single view portable chest radiograph      COMPARISON: None     FINDINGS: Allowing for apparent submaximal inspiration, there is no  infiltrate, effusion, pneumothorax or suspicious nodule. Heart size and  pulmonary vascularity and bony structures are normal.       Impression:       Submaximal inspiration, likely negative portable chest  radiograph.     This report was finalized on 4/8/2018 7:32 AM by Dr. Marcio Pompa MD.       XR Tibia Fibula 2 View Right [[id number]] Collected:  04/08/18 0729     Updated:  04/08/18 0733    Narrative:       INDICATION: Acute right leg pain after fall immediately prior to  arrival.     COMPARISON: None available.     FINDINGS: 5 views of the right tibia/fibula.  There is no oblique distal  tibial fracture a few centimeters above the plafond, and an oblique  fracture of the fibular neck.       No bone erosion or destruction. Articulation at the knee and ankle is  anatomic..  No foreign body.       Impression:        Oblique distal tibial and proximal fibular fractures mildly to  moderately displaced.     This report was finalized on 4/8/2018 7:31 AM by Dr. Marcio Pompa MD.                  lactated ringers 100 mL/hr Last Rate: 100 mL/hr (04/08/18 2132)         Assessment/Plan     Patient Active Problem List   Diagnosis Code   • Closed fracture of right fibula and tibia S82.201A, S82.401A   • Tibia/fibula fracture, right, closed, initial encounter S82.201A, S82.401A       We'll increase his pain medication.  Changes Lyrica to 150 mg twice a day.  PT OT today.  Home hopefully tomorrow.      Daljit lBanca MD  04/09/18  7:27 AM

## 2018-04-09 NOTE — PLAN OF CARE
Problem: Patient Care Overview  Goal: Plan of Care Review  Outcome: Ongoing (interventions implemented as appropriate)   04/09/18 1156   OTHER   Outcome Summary OT: Eval complete. Pt is conditionally independent with all LB ADLs and functional transfers. Pt educated on non weightbear status and impact on daily routine. Pt and spouse verbalized no concerns at this time. No further inpatient skilled OT services recommended at this time. Discharge to home with family support.

## 2018-04-09 NOTE — PLAN OF CARE
Problem: Patient Care Overview  Goal: Plan of Care Review  Outcome: Ongoing (interventions implemented as appropriate)   04/09/18 1721   Coping/Psychosocial   Plan of Care Reviewed With patient;spouse   OTHER   Outcome Summary VSS. Pt. up OOB with crutches independently. Pain controlled with PRN Percocets and IV Dilaudid. Hemovac pulled this shift per order. Plan home tomorrow.    Plan of Care Review   Progress improving       Problem: Fall Risk (Adult)  Goal: Absence of Fall  Outcome: Ongoing (interventions implemented as appropriate)   04/09/18 1721   Fall Risk (Adult)   Absence of Fall making progress toward outcome       Problem: Skin Injury Risk (Adult)  Goal: Skin Health and Integrity  Outcome: Ongoing (interventions implemented as appropriate)   04/09/18 1721   Skin Injury Risk (Adult)   Skin Health and Integrity making progress toward outcome       Problem: Pain, Acute (Adult)  Goal: Identify Related Risk Factors and Signs and Symptoms  Outcome: Ongoing (interventions implemented as appropriate)   04/09/18 1721   Pain, Acute (Adult)   Related Risk Factors (Acute Pain) surgery;trauma;procedure/treatment;positioning;knowledge deficit   Signs and Symptoms (Acute Pain) facial mask of pain/grimace;fatigue/weakness;verbalization of pain descriptors     Goal: Acceptable Pain Control/Comfort Level  Outcome: Ongoing (interventions implemented as appropriate)   04/09/18 1721   Pain, Acute (Adult)   Acceptable Pain Control/Comfort Level making progress toward outcome       Problem: Fracture Orthopaedic (Adult)  Goal: Signs and Symptoms of Listed Potential Problems Will be Absent, Minimized or Managed (Fracture Orthopaedic)  Outcome: Ongoing (interventions implemented as appropriate)   04/09/18 1721   Goal/Outcome Evaluation   Problems Assessed (Orthopaedic Fracture) all   Problems Present (Orthopaedic Fracture) pain;situational response;functional deficit/self-care deficit       Problem: Mobility, Physical Impaired  (Adult)  Goal: Identify Related Risk Factors and Signs and Symptoms  Outcome: Ongoing (interventions implemented as appropriate)   04/09/18 1721   Mobility, Physical Impaired (Adult)   Related Risk Factors (Physical Mobility, Impaired) activity intolerance;pain/discomfort   Signs and Symptoms (Physical Mobility Impaired) decreased balance;limited ability to perform gross/fine motor skills     Goal: Enhanced Mobility Skills  Outcome: Ongoing (interventions implemented as appropriate)   04/09/18 1721   Mobility, Physical Impaired (Adult)   Enhanced Mobility Skills making progress toward outcome     Goal: Enhanced Functional Ability  Outcome: Ongoing (interventions implemented as appropriate)   04/09/18 1721   Mobility, Physical Impaired (Adult)   Enhanced Functional Ability making progress toward outcome

## 2018-04-09 NOTE — NURSING NOTE
Continued Stay Note  JIMMY Alves     Patient Name: Derick Amaya  MRN: [id number]  Today's Date: 4/9/2018    Admit Date: 4/7/2018          Discharge Plan     Row Name 04/09/18 5057       Plan    Plan plan home    Plan Comments Rec'd call from PT stating patient has crutches but is interested in cost of wc and if covered by insurance. Spoke with Joellen/Tc who states monthly rental is approximately $50. Spoke with patient at bedside, explained rental cost. He states he will discuss it with his wife. CM # placed on white board, will continue to follow.    Row Name 04/09/18 6264       Plan    Plan Comments Spoke with Jessica at insurance company concerning clarification of inpatient status.  Will continue to follow              Discharge Codes    No documentation.           Jabari Devries RN

## 2018-04-09 NOTE — PLAN OF CARE
Problem: Patient Care Overview  Goal: Plan of Care Review  Outcome: Ongoing (interventions implemented as appropriate)   04/09/18 0254   Coping/Psychosocial   Plan of Care Reviewed With patient   OTHER   Outcome Summary POD 0, RLE elevated w/ice, NWB to RLE, pain managed w/PO pain medicine; IVF infusing; hemovac to RLE - small bloody output     Goal: Individualization and Mutuality  Outcome: Ongoing (interventions implemented as appropriate)    Goal: Discharge Needs Assessment  Outcome: Ongoing (interventions implemented as appropriate)      Problem: Fall Risk (Adult)  Goal: Absence of Fall  Outcome: Ongoing (interventions implemented as appropriate)      Problem: Skin Injury Risk (Adult)  Goal: Skin Health and Integrity  Outcome: Ongoing (interventions implemented as appropriate)

## 2018-04-09 NOTE — THERAPY DISCHARGE NOTE
Acute Care - Physical Therapy Initial Eval/Discharge   Lianne Epps     Patient Name: Derick Amaya  : 1990  MRN: [id number]  Today's Date: 2018   Onset of Illness/Injury or Date of Surgery: 18  Date of Referral to PT: 18  Referring Physician: Dr. Blanca      Admit Date: 2018    Visit Dx:    ICD-10-CM ICD-9-CM   1. Closed fracture of right tibia and fibula, initial encounter S82.201A 823.82    S82.401A      Patient Active Problem List   Diagnosis   • Closed fracture of right fibula and tibia   • Tibia/fibula fracture, right, closed, initial encounter     History reviewed. No pertinent past medical history.  Past Surgical History:   Procedure Laterality Date   • APPENDECTOMY     • VASECTOMY            PT ASSESSMENT (last 72 hours)      Physical Therapy Evaluation     Row Name 18 0840          PT Evaluation Time/Intention    Subjective Information complains of;pain  -BP     Document Type evaluation  -BP     Mode of Treatment physical therapy  -BP     Patient Effort good  -BP     Symptoms Noted During/After Treatment none  -BP     Row Name 18 0840          General Information    Patient Profile Reviewed? yes  -BP     Onset of Illness/Injury or Date of Surgery 18  -BP     Referring Physician Dr. Blanca  -BP     Patient Observations agree to therapy  -BP     Patient/Family Observations Patient supine in bed with HOB elevated. In no acute distress. Wife present. Agress to evaluation   -BP     Prior Level of Function independent:;gait;transfer;bed mobility;ADL's;driving;work;using stairs  -BP     Equipment Currently Used at Home crutches  -BP     Pertinent History of Current Functional Problem Patient presented to ED s/p fall at a concert.    -BP     Existing Precautions/Restrictions non-weight bearing  -BP     Risks Reviewed patient:;spouse/S.O.:;LOB;increased discomfort;dizziness  -BP     Benefits Reviewed patient:;spouse/S.O.:;improve function;increase independence;increase  strength  -BP     Barriers to Rehab none identified  -BP     Row Name 04/09/18 0840          Relationship/Environment    Lives With spouse  -BP     Row Name 04/09/18 0840          Resource/Environmental Concerns    Current Living Arrangements home/apartment/condo  -BP     Row Name 04/09/18 0840          Home Main Entrance    Number of Stairs, Main Entrance six  -BP     Stairs Comment, Main Entrance 6 stairs with one handrail and 1 threshold stair into the door.   -BP     Row Name 04/09/18 0840          Cognitive Assessment/Interventions    Additional Documentation Cognitive Assessment/Intervention (Group)  -BP     Row Name 04/09/18 0840          Cognitive Assessment/Intervention- PT/OT    Personal Safety Interventions gait belt;nonskid shoes/slippers when out of bed  -BP     Row Name 04/09/18 0840          Mobility Assessment/Treatment    Extremity Weight-bearing Status right lower extremity  -BP     Right Lower Extremity (Weight-bearing Status) non weight-bearing (NWB)  -BP     Row Name 04/09/18 0840          Bed Mobility Assessment/Treatment    Bed Mobility Assessment/Treatment supine-sit  -BP     Supine-Sit [place] (Bed Mobility) conditional independence  -BP     Assistive Device (Bed Mobility) head of bed elevated  -BP     Row Name 04/09/18 0840          Transfer Assessment/Treatment    Transfer Assessment/Treatment sit-stand transfer;stand-sit transfer  -BP     Maintains Weight-bearing Status (Transfers) able to maintain  -BP     Comment (Transfers) Patient requires verbal cues for AD placement as well as hand placement. ABle to demonstrate carry over. Initially requires CGA however able to transition to SBA  -BP     Sit-Stand [place] (Transfers) contact guard;stand by assist;verbal cues  -BP     Stand-Sit [place] (Transfers) contact guard;stand by assist;verbal cues  -BP     Row Name 04/09/18 0840          Sit-Stand Transfer    Assistive Device (Sit-Stand Transfers) crutches, axillary  -BP      Row Name 04/09/18 0840          Stand-Sit Transfer    Assistive Device (Stand-Sit Transfers) crutches, axillary  -BP     Row Name 04/09/18 0840          Gait/Stairs Assessment/Training    [place] Level (Gait) contact guard;stand by assist;verbal cues  -BP     Assistive Device (Gait) crutches, axillary  -BP     Distance in Feet (Gait) 50  -BP     Bilateral Gait Deviations forward flexed posture  -BP     Maintains Weight-bearing Status (Gait) able to maintain  -BP     Negotiation (Stairs) stairs assistive device;stairs independence  -BP     [place] Level (Stairs) contact guard;verbal cues  -BP     Assistive Device (Stairs) crutches, axillary  -BP     Handrail Location (Stairs) right side (ascending)  -BP     Number of Steps (Stairs) 7  -BP     Maintains Weight-bearing Status (Stairs) able to maintain  -BP     Comment (Gait/Stairs) Patient able to manage forward gait, directional changes and navigation of external obstacles with CGA. Patient initially requires verbal cue for decreased step length however able to correct. Patient ascended/descended 6 stairs with one crutch and one hand rail with CGA. Patient ascended stairs backward secondary to inability to flex R knee. Patient ascended/descended one step with 2 crutches with CGA. Patient requires verbal cues for sequencing   -BP     Row Name 04/09/18 0840          General ROM    GENERAL ROM COMMENTS L LE AROM WFL  -BP     Row Name 04/09/18 0840          General Assessment (Manual Muscle Testing)    Comment, General Manual Muscle Testing (MMT) Assessment L LE strength functional  -BP     Row Name 04/09/18 0840          Sensory Assessment/Intervention    Sensory General Assessment --   Pt denies numbness and tingling B LE's   -BP     Row Name 04/09/18 0840          Pain Assessment    Additional Documentation Pain Scale: Numbers Pre/Post-Treatment (Group)  -BP     Row Name 04/09/18 0840          Pain Scale: Numbers Pre/Post-Treatment    Pain Scale: Numbers,  Pretreatment 5/10  -BP     Pain Scale: Numbers, Post-Treatment 6/10  -BP     Pain Location - Side Right  -BP     Pain Location --   leg  -BP     Pain Intervention(s) Repositioned;Cold applied  -BP     Row Name             Wound 04/08/18 1256 Right leg incision    Wound - Properties Group Date first assessed: 04/08/18  -KB Time first assessed: 1256  -KB Side: Right  -KB Location: leg  -KB Type: incision  -KB    Row Name 04/09/18 0840          Plan of Care Review    Plan of Care Reviewed With patient;spouse  -BP     Row Name 04/09/18 0840          Physical Therapy Clinical Impression    Date of Referral to PT 04/08/18  -BP     Criteria for Skilled Interventions Met (PT Clinical Impression) no problems identified which require skilled intervention  -BP     Care Plan Review (PT) evaluation/treatment results reviewed  -BP     Care Plan Review, Other Participant (PT Clinical Impression) spouse  -BP     Row Name 04/09/18 0840          Positioning and Restraints    Pre-Treatment Position in bed  -BP     Post Treatment Position wheelchair  -BP     In Wheelchair notified nsg;sitting;call light within reach;encouraged to call for assist;with family/caregiver  -BP     Row Name 04/09/18 0840          Living Environment    Home Accessibility stairs to enter home  -BP       User Key  (r) = Recorded By, (t) = Taken By, (c) = Cosigned By    Initials Name Provider Type    BP Savanna Quinteros, PT Physical Therapist    KB Yarelis Quigley, RN Registered Nurse          Physical Therapy Education     Title: PT OT SLP Therapies (Done)     Topic: Physical Therapy (Done)     Point: Mobility training (Done)    Learning Progress Summary     Learner Status Readiness Method Response Comment Documented by    Patient Done Acceptance E VU  BP 04/09/18 1055    Significant Other Done Acceptance E VU  BP 04/09/18 1055          Point: Precautions (Done)    Learning Progress Summary     Learner Status Readiness Method Response Comment Documented by     Patient Done Acceptance E [facility] 04/09/18 1055    Significant Other Done Acceptance E [facility] 04/09/18 1055                      User Key     Initials Effective Dates Name Provider Type Discipline     04/03/18 -  Savanna Quinteros, PT Physical Therapist PT                PT Recommendation and Plan  Anticipated Discharge Disposition (PT): home or self care  Therapy Frequency (PT Clinical Impression): evaluation only  Outcome Summary/Treatment Plan (PT)  Anticipated Equipment Needs at Discharge (PT): wheelchair (with elevating leg rest)  Anticipated Discharge Disposition (PT): home or self care  Plan of Care Reviewed With: patient  Outcome Summary: PT Eval Complete: Patient performs supine to sit transfer with conditional independence, sit to/from stand transfers with CGA/SBA and gait x 50 feet with CGA with use of crutches. Patient ascends/descends 7 stairs with use of crutches and CGA. He is able to maintain NWB status with all functional transfers and mobility. Patient and wife have no concerns for return home. Recommend continued use of crutches for mobility. Patient may benefit from w/c for community distances. No further inpatient skilled PT recommendations or needs at this time.           Outcome Measures     Row Name 04/09/18 0840             How much help from another person do you currently need...    Turning from your back to your side while in flat bed without using bedrails? 4  -BP      Moving from lying on back to sitting on the side of a flat bed without bedrails? 4  -BP      Moving to and from a bed to a chair (including a wheelchair)? 3  -BP      Standing up from a chair using your arms (e.g., wheelchair, bedside chair)? 3  -BP      Climbing 3-5 steps with a railing? 3  -BP      To walk in hospital room? 3  -BP      AM-PAC 6 Clicks Score 20  -BP         Functional Assessment    Outcome Measure Options AM-PAC 6 Clicks Basic Mobility (PT)  -BP        User Key  (r) = Recorded By, (t) = Taken By, (c) =  Cosigned By    Initials Name Provider Type    BP Savanna Quinteros, PT Physical Therapist           Time Calculation:         PT Charges     Row Name 04/09/18 1058             Time Calculation    Start Time 0840  -BP      PT Received On 04/09/18  -BP        User Key  (r) = Recorded By, (t) = Taken By, (c) = Cosigned By    Initials Name Provider Type    BP Savanna Quinteros PT Physical Therapist          Therapy Charges for Today     Code Description Service Date Service Provider Modifiers Qty    73552049860 HC PT EVAL LOW COMPLEXITY 3 4/9/2018 Savanna Quinteros, PT GP 1          PT G-Codes  Outcome Measure Options: AM-PAC 6 Clicks Basic Mobility (PT)    PT Discharge Summary  Anticipated Discharge Disposition (PT): home or self care  Reason for Discharge: Maximum functional potential achieved    Savanna Quinteros PT  4/9/2018

## 2018-04-09 NOTE — THERAPY EVALUATION
Acute Care - Occupational Therapy Initial Evaluation/Discharge   Lianne Epps     Patient Name: Derick Amaya  : 1990  MRN: [id number]  Today's Date: 2018  Onset of Illness/Injury or Date of Surgery: 18     Referring Physician: Daljit Blanca MD    Admit Date: 2018       ICD-10-CM ICD-9-CM   1. Closed fracture of right tibia and fibula, initial encounter S82.201A 823.82    S82.401A      Patient Active Problem List   Diagnosis   • Closed fracture of right fibula and tibia   • Tibia/fibula fracture, right, closed, initial encounter     History reviewed. No pertinent past medical history.  Past Surgical History:   Procedure Laterality Date   • APPENDECTOMY     • VASECTOMY            OT ASSESSMENT FLOWSHEET (last 72 hours)      Occupational Therapy Evaluation     Row Name 18 0910                   OT Evaluation Time/Intention    Subjective Information complains of;pain  -SD (r) TP (t) SD (c)        Document Type evaluation;discharge treatment  -SD (r) TP (t) SD (c)        Mode of Treatment occupational therapy  -SD (r) TP (t) SD (c)        Patient Effort good  -SD (r) TP (t) SD (c)        Symptoms Noted During/After Treatment none  -SD (r) TP (t) SD (c)           General Information    Patient Profile Reviewed? yes  -SD (r) TP (t) SD (c)        Onset of Illness/Injury or Date of Surgery 18  -SD (r) TP (t) SD (c)        Referring Physician Daljit Blanca MD  -SD (r) TP (t) SD (c)        Patient Observations agree to therapy  -SD (r) TP (t) SD (c)        Patient/Family Observations Pt supine in bed in no acute distress. Pt spouse in room.  -SD (r) TP (t) SD (c)        Prior Level of Function independent:;all household mobility;community mobility;ADL's;home management  -SD (r) TP (t) SD (c)        Equipment Currently Used at Home crutches  -SD (r) TP (t) SD (c)        Pertinent History of Current Functional Problem Pt suffered injury at concert event. Pt presented to ED with fracture of the  right proximal fibula and oblique fracture of the distal one third of the tibia with displacement  -SD (r) TP (t) SD (c)        Risks Reviewed patient:;spouse/S.O.:;LOB;increased discomfort;dizziness  -SD (r) TP (t) SD (c)        Benefits Reviewed patient:;spouse/S.O.:;improve function;increase independence;increase strength  -SD (r) TP (t) SD (c)        Barriers to Rehab none identified  -SD (r) TP (t) SD (c)           Relationship/Environment    Lives With spouse  -SD (r) TP (t) SD (c)           Resource/Environmental Concerns    Current Living Arrangements home/apartment/condo  -SD (r) TP (t) SD (c)           Home Main Entrance    Number of Stairs, Main Entrance six  -SD (r) TP (t) SD (c)        Stairs Comment, Main Entrance Pt has 6 stairs from deck to main entrance and then 2 more steps to enter home. Steps from deck has railings on both sides but rails are 8 ft apart.  -SD (r) TP (t) SD (c)           Cognitive Assessment/Intervention- PT/OT    Personal Safety Interventions gait belt;nonskid shoes/slippers when out of bed  -SD (r) TP (t) SD (c)           Mobility Assessment/Treatment    Extremity Weight-bearing Status right lower extremity  -SD (r) TP (t) SD (c)        Right Lower Extremity (Weight-bearing Status) non weight-bearing (NWB)  -SD (r) TP (t) SD (c)           Bed Mobility Assessment/Treatment    Bed Mobility Assessment/Treatment supine-sit  -SD (r) TP (t) SD (c)        Supine-Sit [place] (Bed Mobility) conditional independence  -SD (r) TP (t) SD (c)        Assistive Device (Bed Mobility) head of bed elevated  -SD (r) TP (t) SD (c)           Functional Mobility    Functional Mobility- Ind. Level contact guard assist  -SD (r) TP (t) SD (c)        Functional Mobility- Device axillary crutches  -SD (r) TP (t) SD (c)        Functional Mobility-Distance (Feet) 50  -SD (r) TP (t) SD (c)        Functional Mobility-Maintain WBing able to maintain weight bearing status  -SD (r) TP (t) SD (c)         Functional Mobility- Safety Issues balance decreased during turns;step length decreased  -SD (r) TP (t) SD (c)        Functional Mobility- Comment Pt was able to maintaing weight bearing status and demonstrated no LOBs during functional mobility.  -SD (r) TP (t) SD (c)           Transfer Assessment/Treatment    Transfer Assessment/Treatment sit-stand transfer;stand-sit transfer  -SD (r) TP (t) SD (c)        Maintains Weight-bearing Status (Transfers) able to maintain  -SD (r) TP (t) SD (c)        Comment (Transfers) Verbal cues for hand and AE placement., as well as RLE placement with sit-stand-sit transfers.  -SD (r) TP (t) SD (c)        Sit-Stand [place] (Transfers) contact guard;stand by assist;verbal cues  -SD (r) TP (t) SD (c)        Stand-Sit [place] (Transfers) contact guard;stand by assist;verbal cues  -SD (r) TP (t) SD (c)           Sit-Stand Transfer    Assistive Device (Sit-Stand Transfers) crutches, axillary  -SD (r) TP (t) SD (c)           Stand-Sit Transfer    Assistive Device (Stand-Sit Transfers) crutches, axillary  -SD (r) TP (t) SD (c)           Bathing Assessment/Intervention    Bathing [place] Level lower body;conditional independence  -SD (r) TP (t) SD (c)        Bathing Position supported sitting  -SD (r) TP (t) SD (c)        Comment (Bathing) Pt needs exended time and relies on compensatory stratgies for managing LB bathing due to pain and limited ROM  -SD (r) TP (t) SD (c)           Lower Body Dressing Assessment/Training    Lower Body Dressing [place] Level lower body dressing skills;conditional independence  -SD (r) TP (t) SD (c)        Lower Body Dressing Position supported sitting  -SD (r) TP (t) SD (c)        Comment (Lower Body Dressing) Pt needs exended time and relies on compensatory stratgies for managing LB dressing due to pain and limited ROM  -SD (r) TP (t) SD (c)           Toileting Assessment/Training    [place] Level (Toileting) conditional independence   -SD (r) TP (t) SD (c)        Comment (Toileting) Pt needs exended time and relies on compensatory stratgies for managing toileting due to pain and limited ROM  -SD (r) TP (t) SD (c)           General ROM    GENERAL ROM COMMENTS BUE AROM WFL  -SD (r) TP (t) SD (c)           General Assessment (Manual Muscle Testing)    Comment, General Manual Muscle Testing (MMT) Assessment BUE MMT WFL  -SD (r) TP (t) SD (c)           Positioning and Restraints    Pre-Treatment Position in bed  -SD (r) TP (t) SD (c)        Post Treatment Position wheelchair  -SD (r) TP (t) SD (c)        In Wheelchair notified nsg;sitting;call light within reach;encouraged to call for assist;with family/caregiver  -SD (r) TP (t) SD (c)           Pain Scale: Numbers Pre/Post-Treatment    Pain Scale: Numbers, Pretreatment 5/10  -SD (r) TP (t) SD (c)        Pain Scale: Numbers, Post-Treatment 5/10  -SD (r) TP (t) SD (c)        Pain Location - Side Right  -SD (r) TP (t) SD (c)        Pain Location --   leg  -SD (r) TP (t) SD (c)        Pain Intervention(s) Repositioned  -SD (r) TP (t) SD (c)              Living Environment    Home Accessibility stairs within home  -SD (r) TP (t) SD (c)          User Key  (r) = Recorded By, (t) = Taken By, (c) = Cosigned By    Initials Name Effective Dates    WICHO Perez, OTR 06/22/16 -     TP Quinton Lovelace OT Student 03/07/18 -            Occupational Therapy Education     Title: PT OT SLP Therapies (Done)     Topic: Occupational Therapy (Resolved)     Point: ADL training (Resolved)     Description: Instruct learner(s) on proper safety adaptation and remediation techniques during self care or transfers.   Instruct in proper use of assistive devices.   Learning Progress Summary     Learner Status Readiness Method Response Comment Documented by    Patient Done Acceptance CHALINO CID DU Pt was educated on purpose of OT, compensatory strategies managing LB ADLs and home safety. TP 04/09/18 1153                      User  Key     Initials Effective Dates Name Provider Type Discipline    TP 03/07/18 -  Quinton Lovelace OT Student OT Student OT                  OT Recommendation and Plan  Outcome Summary/Treatment Plan (OT)  Anticipated Discharge Disposition (OT): home or self care  Outcome Summary: OT: Eval complete. Pt is conditionally independent with all LB ADLs and functional transfers. Pt educated on non weightbear status and impact on daily routine. Pt and spouse verbalized no concerns at this time. No further inpatient skilled OT services recommended at this time. Discharge to home with family support.             Outcome Measures     Row Name 04/09/18 0910          How much help from another is currently needed...    Putting on and taking off regular lower body clothing? 3  -SD (r) TP (t) SD (c)    Bathing (including washing, rinsing, and drying) 3  -SD (r) TP (t) SD (c)    Toileting (which includes using toilet bed pan or urinal) 4  -SD (r) TP (t) SD (c)    Putting on and taking off regular upper body clothing 4  -SD (r) TP (t) SD (c)    Taking care of personal grooming (such as brushing teeth) 4  -SD (r) TP (t) SD (c)    Eating meals 4  -SD (r) TP (t) SD (c)    Score 22  -SD (r) TP (t)       Functional Assessment    Outcome Measure Options AM-PAC 6 Clicks Daily Activity (OT)  -SD (r) TP (t) SD (c)      User Key  (r) = Recorded By, (t) = Taken By, (c) = Cosigned By    Initials Name Provider Type    WICHO Perez, OTR Occupational Therapist    TP Quinton Lovelace OT Student OT Student          Time Calculation:   OT Start Time: 0840  OT Stop Time: 0905  OT Time Calculation (min): 25 min    Therapy Charges for Today     Code Description Service Date Service Provider Modifiers Qty    26232997353  OT EVAL LOW COMPLEXITY 2 4/9/2018 Quinton Lovelace OT Student GO 1        OT Discharge Summary  Anticipated Discharge Disposition (OT): home or self care  Reason for Discharge: other (comment) (no OT services recommended)  Outcomes  Achieved: Discharge from facility occurred on same date as evluation  Discharge Destination: Home       Quinton Lovelace OT Student  4/9/2018

## 2018-04-09 NOTE — PROGRESS NOTES
Patient: Derick Amaya  Procedure(s):  TIBIA INTRAMEDULLARY NAIL/RAKESH INSERTION-alfonso  Anesthesia type: [unfilled]    Patient location: [facility] Surgical Floor  Last vitals:   Vitals:    04/09/18 0611   BP: 134/71   Pulse: 88   Resp: 16   Temp: 98.1 °F (36.7 °C)   SpO2: 96%     Level of consciousness: awake, alert and oriented    Post-anesthesia pain: adequate analgesia  Airway patency: patent  Respiratory: unassisted  Cardiovascular: stable and blood pressure at baseline  Hydration: euvolemic    Anesthetic complications: no

## 2018-04-09 NOTE — NURSING NOTE
Continued Stay Note  JIMMY Alves     Patient Name: Derick Amaya  MRN: [id number]  Today's Date: 4/9/2018    Admit Date: 4/7/2018          Discharge Plan     Row Name 04/09/18 1331       Plan    Plan Comments Spoke with Jessica at insurance company concerning clarification of inpatient status.  Will continue to follow              Discharge Codes    No documentation.           Aissatou Stock RN

## 2018-04-09 NOTE — PLAN OF CARE
Problem: Patient Care Overview  Goal: Plan of Care Review   04/09/18 1051   Coping/Psychosocial   Plan of Care Reviewed With patient   OTHER   Outcome Summary PT Eval Complete: Patient performs supine to sit transfer with conditional independence, sit to/from stand transfers with CGA/SBA and gait x 50 feet with CGA with use of crutches. Patient ascends/descends 7 stairs with use of crutches and CGA. He is able to maintain NWB status with all functional transfers and mobility. Patient and wife have no concerns for return home. Recommend continued use of crutches for mobility. Patient may benefit from w/c for community distances. No further inpatient skilled PT recommendations or needs at this time.

## 2018-04-10 ENCOUNTER — TELEPHONE (OUTPATIENT)
Dept: ORTHOPEDIC SURGERY | Facility: CLINIC | Age: 28
End: 2018-04-10

## 2018-04-10 VITALS
HEIGHT: 69 IN | TEMPERATURE: 98.6 F | HEART RATE: 95 BPM | BODY MASS INDEX: 31.4 KG/M2 | SYSTOLIC BLOOD PRESSURE: 126 MMHG | WEIGHT: 212 LBS | DIASTOLIC BLOOD PRESSURE: 72 MMHG | RESPIRATION RATE: 18 BRPM | OXYGEN SATURATION: 97 %

## 2018-04-10 PROBLEM — S82.201A CLOSED FRACTURE OF RIGHT FIBULA AND TIBIA: Status: RESOLVED | Noted: 2018-04-08 | Resolved: 2018-04-10

## 2018-04-10 PROBLEM — S82.401A TIBIA/FIBULA FRACTURE, RIGHT, CLOSED, INITIAL ENCOUNTER: Status: RESOLVED | Noted: 2018-04-08 | Resolved: 2018-04-10

## 2018-04-10 PROBLEM — S82.201A TIBIA/FIBULA FRACTURE, RIGHT, CLOSED, INITIAL ENCOUNTER: Status: RESOLVED | Noted: 2018-04-08 | Resolved: 2018-04-10

## 2018-04-10 PROBLEM — S82.401A CLOSED FRACTURE OF RIGHT FIBULA AND TIBIA: Status: RESOLVED | Noted: 2018-04-08 | Resolved: 2018-04-10

## 2018-04-10 PROCEDURE — 25010000002 ENOXAPARIN PER 10 MG: Performed by: ORTHOPAEDIC SURGERY

## 2018-04-10 PROCEDURE — 25010000002 HYDROMORPHONE PER 4 MG: Performed by: ORTHOPAEDIC SURGERY

## 2018-04-10 PROCEDURE — 99024 POSTOP FOLLOW-UP VISIT: CPT | Performed by: NURSE PRACTITIONER

## 2018-04-10 PROCEDURE — 99024 POSTOP FOLLOW-UP VISIT: CPT | Performed by: ORTHOPAEDIC SURGERY

## 2018-04-10 RX ORDER — OXYCODONE AND ACETAMINOPHEN 10; 325 MG/1; MG/1
1 TABLET ORAL EVERY 4 HOURS PRN
Qty: 60 TABLET | Refills: 0 | Status: SHIPPED | OUTPATIENT
Start: 2018-04-10 | End: 2018-04-18 | Stop reason: SDUPTHER

## 2018-04-10 RX ORDER — GABAPENTIN 300 MG/1
300 CAPSULE ORAL 3 TIMES DAILY
Qty: 90 CAPSULE | Refills: 0 | Status: SHIPPED | OUTPATIENT
Start: 2018-04-10 | End: 2018-05-06 | Stop reason: SDUPTHER

## 2018-04-10 RX ADMIN — OXYCODONE AND ACETAMINOPHEN 2 TABLET: 10; 325 TABLET ORAL at 01:14

## 2018-04-10 RX ADMIN — OXYCODONE AND ACETAMINOPHEN 2 TABLET: 10; 325 TABLET ORAL at 08:24

## 2018-04-10 RX ADMIN — ENOXAPARIN SODIUM 40 MG: 100 INJECTION SUBCUTANEOUS at 08:24

## 2018-04-10 RX ADMIN — PREGABALIN 150 MG: 75 CAPSULE ORAL at 08:24

## 2018-04-10 RX ADMIN — HYDROMORPHONE HYDROCHLORIDE 1 MG: 2 INJECTION INTRAMUSCULAR; INTRAVENOUS; SUBCUTANEOUS at 05:34

## 2018-04-10 NOTE — PLAN OF CARE
Problem: Fall Risk (Adult)  Intervention: Monitor/Assist with Self Care   04/10/18 0048   Activity   Activity Assistance Provided assistance, stand-by       Goal: Absence of Fall  Outcome: Ongoing (interventions implemented as appropriate)   04/10/18 0048   Fall Risk (Adult)   Absence of Fall making progress toward outcome       Problem: Pain, Acute (Adult)  Goal: Identify Related Risk Factors and Signs and Symptoms  Outcome: Ongoing (interventions implemented as appropriate)   04/10/18 0048   Pain, Acute (Adult)   Related Risk Factors (Acute Pain) patient perception;positioning

## 2018-04-10 NOTE — PROGRESS NOTES
Orthopedic Progress Note   Chief Complaint:  Status post IM rodding right tibia    Subjective     Interval History: Patient is postop day 2.  His hemodynamically stable and afebrile.  Pain control or medication.  Stable with crutche          Objective     Vital Signs  Temp:  [97.5 °F (36.4 °C)-98.6 °F (37 °C)] 98.6 °F (37 °C)  Heart Rate:  [81-95] 95  Resp:  [16-18] 18  BP: (123-138)/(72-80) 126/72  Body mass index is 31.29 kg/m².    Intake/Output Summary (Last 24 hours) at 04/10/18 0628  Last data filed at 04/09/18 1810   Gross per 24 hour   Intake             1080 ml   Output              360 ml   Net              720 ml     No intake/output data recorded.       Physical Exam:   General: patient awake, alert and cooperative   Cardiovascular: regular rhythm and rate   Pulm: clear to auscultation bilaterally   Abdomen: Benign.  Soft bowel sounds   Extremities: Right leg is good distal pulses no motor deficit dressing is dry   Neurologic: Normal mood and behavior     Results Review:     I reviewed the patient's new clinical results.      WBC No results found for: WBCS   HGB Hemoglobin   Date Value Ref Range Status   04/09/2018 13.1 (L) 14.0 - 18.0 g/dL Final   04/08/2018 16.1 14.0 - 18.0 g/dL Final      HCT Hematocrit   Date Value Ref Range Status   04/09/2018 39.1 (L) 42.0 - 52.0 % Final   04/08/2018 47.1 42.0 - 52.0 % Final      Platlets No results found for: LABPLAT     PT/INR:  No results found for: PROTIME/No results found for: INR    Sodium Sodium   Date Value Ref Range Status   04/08/2018 143 136 - 145 mmol/L Final      Potassium Potassium   Date Value Ref Range Status   04/08/2018 4.1 3.5 - 5.2 mmol/L Final      Chloride Chloride   Date Value Ref Range Status   04/08/2018 103 98 - 107 mmol/L Final      Bicarbonate No results found for: PLASMABICARB   BUN BUN   Date Value Ref Range Status   04/08/2018 12 6 - 20 mg/dL Final      Creatinine Creatinine   Date Value Ref Range Status   04/08/2018 0.98 0.76 - 1.27  mg/dL Final      Calcium Calcium   Date Value Ref Range Status   04/08/2018 9.3 8.6 - 10.5 mg/dL Final      Magnesium  AST  ALT  Bilirubin, Total  AlkPhos  Albumin    Amylase  Lipase    Radiology: No results found for: MG  No components found for: AST.*  No components found for: ALT.*  No components found for: BILIRUBIN, TOTAL.*    No components found for: ALKPHOS.*  No components found for: ALBUMIN.*      No components found for: AMYLASE.*  No components found for: LIPASE.*            Imaging Results (most recent)     Procedure Component Value Units Date/Time    FL C Arm During Surgery [[id number]] Resulted:  04/09/18 1000     Updated:  04/09/18 1000    XR Tibia Fibula 2 View Right [[id number]] Collected:  04/09/18 0823     Updated:  04/09/18 0858    Narrative:       FLUOROSCOPY DURING ORIF RIGHT TIBIA FRACTURE, 4/8/2018:     HISTORY:  27-year-old male fractures of the right tibia and fibula.     REPORT:  C-arm fluoroscopy was provided by radiology personnel in the OR during  ORIF surgery. Fluoroscopy time was recorded as 10 minutes, 26 seconds. 5  spot film images were recorded for documentation purposes. Please see  operative note for details.     This report was finalized on 4/9/2018 8:56 AM by Dr. Teodoro Yancey MD.       XR Chest 1 View [[id number]] Collected:  04/08/18 0731     Updated:  04/08/18 0734    Narrative:       EXAM: Portable chest radiograph     HISTORY: Preop exam prior to ORIF right tibial and fibular fractures     TECHNIQUE: Single view portable chest radiograph      COMPARISON: None     FINDINGS: Allowing for apparent submaximal inspiration, there is no  infiltrate, effusion, pneumothorax or suspicious nodule. Heart size and  pulmonary vascularity and bony structures are normal.       Impression:       Submaximal inspiration, likely negative portable chest  radiograph.     This report was finalized on 4/8/2018 7:32 AM by Dr. Marcio Pompa MD.       XR Tibia Fibula 2 View Right [[id number]]  Collected:  04/08/18 0729     Updated:  04/08/18 0733    Narrative:       INDICATION: Acute right leg pain after fall immediately prior to  arrival.     COMPARISON: None available.     FINDINGS: 5 views of the right tibia/fibula.  There is no oblique distal  tibial fracture a few centimeters above the plafond, and an oblique  fracture of the fibular neck.       No bone erosion or destruction. Articulation at the knee and ankle is  anatomic..  No foreign body.       Impression:       Oblique distal tibial and proximal fibular fractures mildly to  moderately displaced.     This report was finalized on 4/8/2018 7:31 AM by Dr. Marcio Pompa MD.                  lactated ringers 100 mL/hr Last Rate: 100 mL/hr (04/08/18 2132)         Assessment/Plan     Patient Active Problem List   Diagnosis Code   • Closed fracture of right fibula and tibia S82.201A, S82.401A   • Tibia/fibula fracture, right, closed, initial encounter S82.201A, S82.401A         Discharge home today.  Return to office in approximately 10 days.    Daljit Blanca MD  04/10/18  6:28 AM

## 2018-04-10 NOTE — TELEPHONE ENCOUNTER
[facility] pharmacy faxing and calling, please clarify Lovenox RX. Is this BID or QD, if QD quantity should read 5.6 if BID it can remain the 11.2 but the directions will need changed.     Thanks.

## 2018-04-10 NOTE — NURSING NOTE
Case Management Discharge Note    Final Note: dc home to self care    Destination     No service coordination in this encounter.      Durable Medical Equipment     No service coordination in this encounter.      Dialysis/Infusion     No service coordination in this encounter.      Home Medical Care     No service coordination in this encounter.      Social Care     No service coordination in this encounter.             Final Discharge Disposition Code: 01 - home or self-care

## 2018-04-11 NOTE — DISCHARGE SUMMARY
Orthopedic Discharge Summary      Patient: Derick Amaya      YOB: 1990    Medical Record Number: [id number]    Attending Physician: No att. providers found  Consulting Physician(s):   Date of Admission: 4/7/2018 11:37 PM  Date of Discharge: 4/10/2018      Patient Active Problem List   Diagnosis   (none) - all problems resolved or deleted     Status Post: TIBIA INTRAMEDULLARY NAIL/RAKESH INSERTION-alfonso      Allergies   Allergen Reactions   • Augmentin [Amoxicillin-Pot Clavulanate]        Current Medications:   Derick Amaya   Home Medication Instructions SHARLENE:[id number]    Printed on:04/11/18 0937   Medication Information                      enoxaparin (LOVENOX) 40 MG/0.4ML solution syringe  Inject 0.4 mL under the skin Daily for 14 days.             gabapentin (NEURONTIN) 300 MG capsule  Take 1 capsule by mouth 3 (Three) Times a Day.             oxyCODONE-acetaminophen (PERCOCET)  MG per tablet  Take 1 tablet by mouth Every 4 (Four) Hours As Needed for Moderate Pain  or Severe Pain  for up to 8 days.                     History reviewed. No pertinent past medical history.  Past Surgical History:   Procedure Laterality Date   • APPENDECTOMY     • VASECTOMY       Social History     Occupational History   • Not on file.     Social History Main Topics   • Smoking status: Never Smoker   • Smokeless tobacco: Never Used   • Alcohol use Yes      Comment: social drinker   • Drug use: No   • Sexual activity: Defer      Social History     Social History Narrative   • No narrative on file     Family History   Problem Relation Age of Onset   • No Known Problems Mother    • No Known Problems Father          Physical Exam: 27 y.o. male  General Appearance:    Alert, cooperative, in no acute distress                      Vitals:    04/09/18 1503 04/09/18 1954 04/09/18 1955 04/09/18 2359   BP: 123/75  138/80 126/72   BP Location: Right arm  Right arm Right arm   Patient Position: Lying  Lying Lying    Pulse: 81 89 89 95   Resp: 16  16 18   Temp: 97.5 °F (36.4 °C)  98.5 °F (36.9 °C) 98.6 °F (37 °C)   TempSrc: Oral  Oral Oral   SpO2: 94% 96% 96% 97%   Weight:       Height:            Head:    Normocephalic, without obvious abnormality, atraumatic   Eyes:            Lids and lashes normal, conjunctivae and sclerae normal, no   icterus, no pallor, corneas clear, PERRLA   Ears:    Ears appear intact with no abnormalities noted   Throat:   No oral lesions, no thrush, oral mucosa moist   Neck:   No adenopathy, supple, trachea midline, no thyromegaly, no    carotid bruit, no JVD   Back:     No kyphosis present, no scoliosis present, no skin lesions,       erythema or scars, no tenderness to percussion or                   palpation,   range of motion normal   Lungs:     Clear to auscultation,respirations regular, even and                   unlabored    Heart:    Regular rhythm and normal rate, normal S1 and S2, no            murmur, no gallop, no rub, no click   Chest Wall:    No abnormalities observed   Abdomen:     Normal bowel sounds, no masses, no organomegaly, soft        non-tender, non-distended, no guarding, no rebound                 tenderness   Rectal:     Deferred   Extremities:   Incision intact without signs or symptoms of infection.               Neurovascular status remains intact to operative extremity.      Moves all extremities well, no edema, no cyanosis, no              redness   Pulses:   Pulses palpable and equal bilaterally   Skin:   No bleeding, bruising or rash   Lymph nodes:   No palpable adenopathy   Neurologic:   Cranial nerves 2 - 12 grossly intact, sensation intact, DTR        present and equal bilaterally           Hospital Course:  27 y.o. male admitted to [facility] to services of Daljit Blanca MD with Closed fracture of right tibia and fibula, initial encounter [S82.201A, S82.401A]  Tibia/fibula fracture, right, closed, initial encounter [S82.201A, S82.401A]  Closed fracture of  right tibia and fibula, initial encounter [S82.201A, S82.401A]  Closed fracture of right tibia and fibula, initial encounter [S82.201A, S82.401A] on 4/7/2018 and underwent TIBIA INTRAMEDULLARY NAIL/RAKESH INSERTION-alfonso  Per Daljit Blanca MD. Antibiotic and VTE prophylaxis were per [facility]IP protocols. Post-operatively the patient transferred to the post-operative floor where the patient underwent mobilization therapy that included active as well as passive ROM exercises. Opioids were titrated to achieve appropriate pain management to allow for participation in mobilization exercises. Vital signs are now stable. The incision is intact without signs or symptoms of infection. Operative extremity neurovascular status remains intact.   Appropriate education re: incision care, activity levels, medications, and follow up visits was completed and all questions were answered. The patient is now deemed stable for discharge to Home.      DIAGNOSTIC TESTS:     Admission on 04/07/2018, Discharged on 04/10/2018   Component Date Value Ref Range Status   • Glucose 04/08/2018 133* 65 - 99 mg/dL Final   • BUN 04/08/2018 12  6 - 20 mg/dL Final   • Creatinine 04/08/2018 0.98  0.76 - 1.27 mg/dL Final   • Sodium 04/08/2018 143  136 - 145 mmol/L Final   • Potassium 04/08/2018 4.1  3.5 - 5.2 mmol/L Final   • Chloride 04/08/2018 103  98 - 107 mmol/L Final   • CO2 04/08/2018 25.9  22.0 - 29.0 mmol/L Final   • Calcium 04/08/2018 9.3  8.6 - 10.5 mg/dL Final   • eGFR Non African Amer 04/08/2018 90  >60 mL/min/1.73 Final   • BUN/Creatinine Ratio 04/08/2018 12.2  7.0 - 25.0 Final   • Anion Gap 04/08/2018 14.1  mmol/L Final   • WBC 04/08/2018 11.66* 4.80 - 10.80 10*3/mm3 Final   • RBC 04/08/2018 5.79  4.70 - 6.10 10*6/mm3 Final   • Hemoglobin 04/08/2018 16.1  14.0 - 18.0 g/dL Final   • Hematocrit 04/08/2018 47.1  42.0 - 52.0 % Final   • MCV 04/08/2018 81.3  80.0 - 94.0 fL Final   • MCH 04/08/2018 27.8  27.0 - 31.0 pg Final   • MCHC 04/08/2018 34.2   31.0 - 37.0 g/dL Final   • RDW 04/08/2018 12.8  11.5 - 14.5 % Final   • RDW-SD 04/08/2018 37.3  37.0 - 54.0 fl Final   • MPV 04/08/2018 11.3* 7.4 - 10.4 fL Final   • Platelets 04/08/2018 268  140 - 500 10*3/mm3 Final   • Neutrophil % 04/08/2018 79.6* 45.0 - 70.0 % Final   • Lymphocyte % 04/08/2018 14.5* 20.0 - 45.0 % Final   • Monocyte % 04/08/2018 5.2  3.0 - 8.0 % Final   • Eosinophil % 04/08/2018 0.1  0.0 - 4.0 % Final   • Basophil % 04/08/2018 0.3  0.0 - 2.0 % Final   • Immature Grans % 04/08/2018 0.3  0.0 - 0.5 % Final   • Neutrophils, Absolute 04/08/2018 9.27* 1.50 - 8.30 10*3/mm3 Final   • Lymphocytes, Absolute 04/08/2018 1.69  0.60 - 4.80 10*3/mm3 Final   • Monocytes, Absolute 04/08/2018 0.61  0.00 - 1.00 10*3/mm3 Final   • Eosinophils, Absolute 04/08/2018 0.01* 0.10 - 0.30 10*3/mm3 Final   • Basophils, Absolute 04/08/2018 0.04  0.00 - 0.20 10*3/mm3 Final   • Immature Grans, Absolute 04/08/2018 0.04* 0.00 - 0.03 10*3/mm3 Final   • nRBC 04/08/2018 0.0  0.0 - 0.0 /100 WBC Final   • Ethanol 04/08/2018 199  mg/dL Final   • Ethanol % 04/08/2018 0.199  % Final   • Hemoglobin 04/09/2018 13.1* 14.0 - 18.0 g/dL Final   • Hematocrit 04/09/2018 39.1* 42.0 - 52.0 % Final       No results found.    Discharge and Follow up Instructions:         Date: 4/11/2018    WTAN Peralta      Time: Discharge less than 30 min

## 2018-04-18 ENCOUNTER — OFFICE VISIT (OUTPATIENT)
Dept: ORTHOPEDIC SURGERY | Facility: CLINIC | Age: 28
End: 2018-04-18

## 2018-04-18 DIAGNOSIS — S82.231D CLOSED DISPLACED OBLIQUE FRACTURE OF SHAFT OF RIGHT TIBIA WITH ROUTINE HEALING, SUBSEQUENT ENCOUNTER: Primary | ICD-10-CM

## 2018-04-18 PROCEDURE — 99024 POSTOP FOLLOW-UP VISIT: CPT | Performed by: ORTHOPAEDIC SURGERY

## 2018-04-18 RX ORDER — OXYCODONE AND ACETAMINOPHEN 10; 325 MG/1; MG/1
1 TABLET ORAL EVERY 6 HOURS PRN
Qty: 30 TABLET | Refills: 0 | Status: SHIPPED | OUTPATIENT
Start: 2018-04-18 | End: 2018-04-25

## 2018-04-18 NOTE — PROGRESS NOTES
Subjective: Status post IM rodding right tibia shaft fracture     Patient ID: Derick Amaya is a 27 y.o. male.    Chief Complaint:    History of Present Illness patient is 10 days out and is doing well.  Ambulating nonweightbearing right leg       Social History     Occupational History   • Not on file.     Social History Main Topics   • Smoking status: Never Smoker   • Smokeless tobacco: Never Used   • Alcohol use Yes      Comment: social drinker   • Drug use: No   • Sexual activity: Defer      Review of Systems   Constitutional: Negative for chills, diaphoresis, fever and unexpected weight change.   HENT: Negative for hearing loss, nosebleeds, sore throat and tinnitus.    Eyes: Negative for pain and visual disturbance.   Respiratory: Negative for cough, shortness of breath and wheezing.    Cardiovascular: Negative for chest pain and palpitations.   Gastrointestinal: Negative for abdominal pain, diarrhea, nausea and vomiting.   Endocrine: Negative for cold intolerance, heat intolerance and polydipsia.   Genitourinary: Negative for difficulty urinating, dysuria and hematuria.   Musculoskeletal: Negative for arthralgias, joint swelling and myalgias.   Skin: Negative for rash and wound.   Allergic/Immunologic: Negative for environmental allergies.   Neurological: Negative for dizziness, syncope and numbness.   Hematological: Does not bruise/bleed easily.   Psychiatric/Behavioral: Negative for dysphoric mood and sleep disturbance. The patient is not nervous/anxious.          No past medical history on file.  Past Surgical History:   Procedure Laterality Date   • APPENDECTOMY     • TIBIA IM NAILING/RODDING Right 4/8/2018    Procedure: TIBIA INTRAMEDULLARY NAIL/RAKESH INSERTION-alfonso;  Surgeon: Daljit Blanca MD;  Location: [facility];  Service: Orthopedics   • VASECTOMY       Family History   Problem Relation Age of Onset   • No Known Problems Mother    • No Known Problems Father          Objective:  There were no vitals  filed for this visit.  There were no vitals filed for this visit.  There is no height or weight on file to calculate BMI.       Ortho Exam  is alert and oriented ×3.  Dressing was changed to wound benign.  New posterior splint applied.    Assessment:       1. Closed displaced oblique fracture of shaft of right tibia with routine healing, subsequent encounter          Plan:      return next week with x-ray the right tibia and probable placement into a fracture boot      Work Status:    LUKE query complete.    Orders:  No orders of the defined types were placed in this encounter.      Medications:  New Medications Ordered This Visit   Medications   • oxyCODONE-acetaminophen (PERCOCET)  MG per tablet     Sig: Take 1 tablet by mouth Every 6 (Six) Hours As Needed for Moderate Pain  or Severe Pain  for up to 8 days.     Dispense:  30 tablet     Refill:  0       Followup:  Return in about 1 week (around 4/25/2018).          Dragon transcription disclaimer     Much of this encounter note is an electronic transcription/translation of spoken language to printed text. The electronic translation of spoken language may permit erroneous, or at times, nonsensical words or phrases to be inadvertently transcribed. Although I have reviewed the note for such errors, some may still exist.

## 2018-04-25 ENCOUNTER — OFFICE VISIT (OUTPATIENT)
Dept: ORTHOPEDIC SURGERY | Facility: CLINIC | Age: 28
End: 2018-04-25

## 2018-04-25 DIAGNOSIS — R52 PAIN: Primary | ICD-10-CM

## 2018-04-25 DIAGNOSIS — Z09 STATUS POST ORTHOPEDIC SURGERY, FOLLOW-UP EXAM: ICD-10-CM

## 2018-04-25 DIAGNOSIS — S82.231D CLOSED DISPLACED OBLIQUE FRACTURE OF SHAFT OF RIGHT TIBIA WITH ROUTINE HEALING, SUBSEQUENT ENCOUNTER: ICD-10-CM

## 2018-04-25 PROCEDURE — 73590 X-RAY EXAM OF LOWER LEG: CPT | Performed by: ORTHOPAEDIC SURGERY

## 2018-04-25 PROCEDURE — 99024 POSTOP FOLLOW-UP VISIT: CPT | Performed by: ORTHOPAEDIC SURGERY

## 2018-04-25 RX ORDER — OXYCODONE HYDROCHLORIDE AND ACETAMINOPHEN 5; 325 MG/1; MG/1
1 TABLET ORAL EVERY 4 HOURS PRN
Qty: 30 TABLET | Refills: 0 | Status: SHIPPED | OUTPATIENT
Start: 2018-04-25 | End: 2018-06-07

## 2018-04-25 RX ORDER — ASPIRIN 81 MG/1
81 TABLET ORAL DAILY
COMMUNITY
End: 2019-01-28

## 2018-04-25 NOTE — PROGRESS NOTES
Subjective: Status post IM rodding right tibia fracture     Patient ID: Derick Amaya is a 27 y.o. male.    Chief Complaint:    History of Present Illness patient is almost 2 weeks out from his surgery and is doing fairly well.  Pain control or medication.  Has been nonweightbearing with the use of crutches       Social History     Occupational History   • Not on file.     Social History Main Topics   • Smoking status: Never Smoker   • Smokeless tobacco: Never Used   • Alcohol use Yes      Comment: social drinker   • Drug use: No   • Sexual activity: Defer      Review of Systems   Constitutional: Negative for chills, diaphoresis, fever and unexpected weight change.   HENT: Negative for hearing loss, nosebleeds, sore throat and tinnitus.    Eyes: Negative for pain and visual disturbance.   Respiratory: Negative for cough, shortness of breath and wheezing.    Cardiovascular: Negative for chest pain and palpitations.   Gastrointestinal: Negative for abdominal pain, diarrhea, nausea and vomiting.   Endocrine: Negative for cold intolerance, heat intolerance and polydipsia.   Genitourinary: Negative for difficulty urinating, dysuria and hematuria.   Musculoskeletal: Positive for arthralgias and joint swelling.   Skin: Negative for rash and wound.   Allergic/Immunologic: Negative for environmental allergies.   Neurological: Negative for dizziness, syncope and numbness.   Hematological: Does not bruise/bleed easily.   Psychiatric/Behavioral: Negative for dysphoric mood and sleep disturbance. The patient is not nervous/anxious.    All other systems reviewed and are negative.          Objective:     Ortho Exam  AP and lateral of the leg shows excellent perfect position of the fracture with anatomic alignment.  No prior x-rays available for comparison.  Examination of the wound out of the splint shows be completely benign.  No motor deficit no sensory loss.  His calf is nontender.    Assessment:       1. Pain    2. Closed  displaced oblique fracture of shaft of right tibia with routine healing, subsequent encounter    3. Status post orthopedic surgery, follow-up exam          Plan:      he was advised to finish the rest of the Lovenox injections that he hasn't been begin 1 adult aspirin a day.  Placed in a fracture boot he can begin to weight-bear as tolerated and that was emphasized as tolerated.  Refilled his pain medication but dropped a.percent 5 mg.  Return in 2 weeks for follow-up visit no x-rays necessary.  Off work until seen

## 2018-05-08 RX ORDER — GABAPENTIN 300 MG/1
CAPSULE ORAL
Qty: 90 CAPSULE | Refills: 0 | Status: SHIPPED | OUTPATIENT
Start: 2018-05-08 | End: 2018-06-07

## 2018-05-10 ENCOUNTER — OFFICE VISIT (OUTPATIENT)
Dept: ORTHOPEDIC SURGERY | Facility: CLINIC | Age: 28
End: 2018-05-10

## 2018-05-10 VITALS — BODY MASS INDEX: 31.84 KG/M2 | HEIGHT: 69 IN | WEIGHT: 215 LBS

## 2018-05-10 DIAGNOSIS — R52 PAIN: Primary | ICD-10-CM

## 2018-05-10 DIAGNOSIS — S82.231D CLOSED DISPLACED OBLIQUE FRACTURE OF SHAFT OF RIGHT TIBIA WITH ROUTINE HEALING, SUBSEQUENT ENCOUNTER: ICD-10-CM

## 2018-05-10 DIAGNOSIS — Z09 STATUS POST ORTHOPEDIC SURGERY, FOLLOW-UP EXAM: ICD-10-CM

## 2018-05-10 PROCEDURE — 73590 X-RAY EXAM OF LOWER LEG: CPT | Performed by: ORTHOPAEDIC SURGERY

## 2018-05-10 PROCEDURE — 99024 POSTOP FOLLOW-UP VISIT: CPT | Performed by: ORTHOPAEDIC SURGERY

## 2018-05-10 NOTE — PROGRESS NOTES
Subjective: Status post IM rodding right tibia fracture     Patient ID: Derick Amaya is a 27 y.o. male.    Chief Complaint:    History of Present Illness 27 y.o. male is almost 4 weeks out is doing well.  Mild soreness about the knee.  Is able to ambulate using the crutches and the fracture boot.  States he did miss stepped the other day but not having any increasing pain as a result of that       Social History     Occupational History   • Not on file.     Social History Main Topics   • Smoking status: Never Smoker   • Smokeless tobacco: Never Used   • Alcohol use Yes      Comment: social drinker   • Drug use: No   • Sexual activity: Defer      Review of Systems   Constitutional: Negative for chills, diaphoresis, fever and unexpected weight change.   HENT: Negative for hearing loss, nosebleeds, sore throat and tinnitus.    Eyes: Negative for pain and visual disturbance.   Respiratory: Negative for cough, shortness of breath and wheezing.    Cardiovascular: Negative for chest pain and palpitations.   Gastrointestinal: Negative for abdominal pain, diarrhea, nausea and vomiting.   Endocrine: Negative for cold intolerance, heat intolerance and polydipsia.   Genitourinary: Negative for difficulty urinating, dysuria and hematuria.   Musculoskeletal: Positive for joint swelling and myalgias. Negative for arthralgias.   Skin: Negative for rash and wound.   Allergic/Immunologic: Negative for environmental allergies.   Neurological: Negative for dizziness, syncope and numbness.   Hematological: Does not bruise/bleed easily.   Psychiatric/Behavioral: Negative for dysphoric mood and sleep disturbance. The patient is not nervous/anxious.          History reviewed. No pertinent past medical history.  Past Surgical History:   Procedure Laterality Date   • APPENDECTOMY     • TIBIA IM NAILING/RODDING Right 4/8/2018    Procedure: TIBIA INTRAMEDULLARY NAIL/RAKESH INSERTION-alfonso;  Surgeon: Daljit Blanca MD;  [facility];   Service: Orthopedics   • VASECTOMY       Family History   Problem Relation Age of Onset   • No Known Problems Mother    • No Known Problems Father          Objective:  There were no vitals filed for this visit.  1    05/10/18  1313   Weight: 97.5 kg (215 lb)     Body mass index is 31.75 kg/m².       Ortho Exam  a repeat x-ray of the leg was done because of the misstep she spoke of AP and lateral shows no change and anatomical alignment of the fracture.  No change in the previous x-ray.  His wounds are completely benign.  No motor deficit Is nontender with a negative Homans.    Assessment:       1. Pain    2. Closed displaced oblique fracture of shaft of right tibia with routine healing, subsequent encounter    3. Status post orthopedic surgery, follow-up exam          Plan:      continue weightbearing as tolerated in the fracture boot with crutches.  Instructed on range of motion exercises out of the boot.  Return to see me in 4 weeks with a repeat x-ray of the tibia off work until cement      Work Status:    LUKE query complete.    Orders:  Orders Placed This Encounter   Procedures   • XR Tibia Fibula 2 View Right       Medications:  No orders of the defined types were placed in this encounter.      Followup:  Return in about 4 weeks (around 6/7/2018).          Dragon transcription disclaimer     Much of this encounter note is an electronic transcription/translation of spoken language to printed text. The electronic translation of spoken language may permit erroneous, or at times, nonsensical words or phrases to be inadvertently transcribed. Although I have reviewed the note for such errors, some may still exist.

## 2018-06-07 ENCOUNTER — OFFICE VISIT (OUTPATIENT)
Dept: ORTHOPEDIC SURGERY | Facility: CLINIC | Age: 28
End: 2018-06-07

## 2018-06-07 VITALS — BODY MASS INDEX: 31.84 KG/M2 | HEIGHT: 69 IN | WEIGHT: 215 LBS

## 2018-06-07 DIAGNOSIS — Z09 STATUS POST ORTHOPEDIC SURGERY, FOLLOW-UP EXAM: ICD-10-CM

## 2018-06-07 DIAGNOSIS — S82.231D CLOSED DISPLACED OBLIQUE FRACTURE OF SHAFT OF RIGHT TIBIA WITH ROUTINE HEALING, SUBSEQUENT ENCOUNTER: ICD-10-CM

## 2018-06-07 DIAGNOSIS — R52 PAIN: Primary | ICD-10-CM

## 2018-06-07 PROCEDURE — 99024 POSTOP FOLLOW-UP VISIT: CPT | Performed by: ORTHOPAEDIC SURGERY

## 2018-06-07 PROCEDURE — 73590 X-RAY EXAM OF LOWER LEG: CPT | Performed by: ORTHOPAEDIC SURGERY

## 2018-06-07 NOTE — PROGRESS NOTES
Subjective: Status post IM rodding right distal tibia fracture     Patient ID: Derick Amaya is a 27 y.o. male.    Chief Complaint:    History of Present Illness 27-year-old male is now 2 weeks out is doing well progressing quite nicely.  Ambulate fracture boot with no pain.       Social History     Occupational History   • Not on file.     Social History Main Topics   • Smoking status: Never Smoker   • Smokeless tobacco: Never Used   • Alcohol use Yes      Comment: social drinker   • Drug use: No   • Sexual activity: Defer      Review of Systems   Constitutional: Negative for chills, diaphoresis, fever and unexpected weight change.   HENT: Negative for hearing loss, nosebleeds, sore throat and tinnitus.    Eyes: Negative for pain and visual disturbance.   Respiratory: Negative for cough, shortness of breath and wheezing.    Cardiovascular: Negative for chest pain and palpitations.   Gastrointestinal: Negative for abdominal pain, diarrhea, nausea and vomiting.   Endocrine: Negative for cold intolerance, heat intolerance and polydipsia.   Genitourinary: Negative for difficulty urinating, dysuria and hematuria.   Musculoskeletal: Positive for myalgias. Negative for arthralgias and joint swelling.   Skin: Negative for rash and wound.   Allergic/Immunologic: Negative for environmental allergies.   Neurological: Negative for dizziness, syncope and numbness.   Hematological: Does not bruise/bleed easily.   Psychiatric/Behavioral: Negative for dysphoric mood and sleep disturbance. The patient is not nervous/anxious.          History reviewed. No pertinent past medical history.  Past Surgical History:   Procedure Laterality Date   • APPENDECTOMY     • TIBIA IM NAILING/RODDING Right 4/8/2018    Procedure: TIBIA INTRAMEDULLARY NAIL/RAKESH INSERTION-alfonso;  Surgeon: Daljit Blanca MD;  Location: [facility];  Service: Orthopedics   • VASECTOMY       Family History   Problem Relation Age of Onset   • No Known Problems Mother    •  No Known Problems Father          Objective:  There were no vitals filed for this visit.  1    06/07/18  1448   Weight: 97.5 kg (215 lb)     Body mass index is 31.75 kg/m².       Ortho Exam  AP and lateral show progression of healing at the fracture site on the distal tibia fracture with compared to previous x-rays.  He is alert and oriented ×3.  Again he has no pain or discomfort in his leg.  Still some numbness around the patella incisional area no effusion.  Good distal pulses no motor deficit no sensory loss.    Assessment:       1. Pain    2. Closed displaced oblique fracture of shaft of right tibia with routine healing, subsequent encounter    3. Status post orthopedic surgery, follow-up exam          Plan:Return to see me in July 9 repeat x-ray will evaluate regarding returning to work.  If he needs to be released on July 2 for FMLA reasons I will release him on the second.  Otherwise return on the ninth with x-rays            Work Status:    LUKE query complete.    Orders:  Orders Placed This Encounter   Procedures   • XR Tibia Fibula 2 View Right       Medications:  No orders of the defined types were placed in this encounter.      Followup:  Return in about 32 days (around 7/9/2018).          Dragon transcription disclaimer     Much of this encounter note is an electronic transcription/translation of spoken language to printed text. The electronic translation of spoken language may permit erroneous, or at times, nonsensical words or phrases to be inadvertently transcribed. Although I have reviewed the note for such errors, some may still exist.

## 2018-07-12 ENCOUNTER — OFFICE VISIT (OUTPATIENT)
Dept: ORTHOPEDIC SURGERY | Facility: CLINIC | Age: 28
End: 2018-07-12

## 2018-07-12 VITALS — BODY MASS INDEX: 31.1 KG/M2 | HEIGHT: 69 IN | WEIGHT: 210 LBS

## 2018-07-12 DIAGNOSIS — S82.231D CLOSED DISPLACED OBLIQUE FRACTURE OF SHAFT OF RIGHT TIBIA WITH ROUTINE HEALING, SUBSEQUENT ENCOUNTER: ICD-10-CM

## 2018-07-12 DIAGNOSIS — Z09 STATUS POST ORTHOPEDIC SURGERY, FOLLOW-UP EXAM: ICD-10-CM

## 2018-07-12 DIAGNOSIS — R52 PAIN: ICD-10-CM

## 2018-07-12 DIAGNOSIS — T14.8XXA FRACTURE: Primary | ICD-10-CM

## 2018-07-12 PROCEDURE — 73590 X-RAY EXAM OF LOWER LEG: CPT | Performed by: ORTHOPAEDIC SURGERY

## 2018-07-12 PROCEDURE — 99213 OFFICE O/P EST LOW 20 MIN: CPT | Performed by: ORTHOPAEDIC SURGERY

## 2018-07-12 NOTE — PROGRESS NOTES
Subjective: Status post IM rodding right tibia fracture     Patient ID: Derick Amaya is a 27 y.o. male.    Chief Complaint:    History of Present Illness 27-year-old male is 4 months out is doing well with absent no pain or discomfort.  He has resumed all activity with no discomfort or restrictions.  Complains just occasional swelling but does not limit his activity       Social History     Occupational History   • Not on file.     Social History Main Topics   • Smoking status: Never Smoker   • Smokeless tobacco: Never Used   • Alcohol use Yes      Comment: social drinker   • Drug use: No   • Sexual activity: Defer      Review of Systems   Constitutional: Negative for chills, diaphoresis, fever and unexpected weight change.   HENT: Negative for hearing loss, nosebleeds, sore throat and tinnitus.    Eyes: Negative for pain and visual disturbance.   Respiratory: Negative for cough, shortness of breath and wheezing.    Cardiovascular: Negative for chest pain and palpitations.   Gastrointestinal: Negative for abdominal pain, diarrhea, nausea and vomiting.   Endocrine: Negative for cold intolerance, heat intolerance and polydipsia.   Genitourinary: Negative for difficulty urinating, dysuria and hematuria.   Musculoskeletal: Positive for joint swelling. Negative for arthralgias and myalgias.   Skin: Negative for rash and wound.   Allergic/Immunologic: Negative for environmental allergies.   Neurological: Negative for dizziness, syncope and numbness.   Hematological: Does not bruise/bleed easily.   Psychiatric/Behavioral: Negative for dysphoric mood and sleep disturbance. The patient is not nervous/anxious.          History reviewed. No pertinent past medical history.  Past Surgical History:   Procedure Laterality Date   • APPENDECTOMY     • TIBIA IM NAILING/RODDING Right 4/8/2018    Procedure: TIBIA INTRAMEDULLARY NAIL/RAKESH INSERTION-alfonso;  Surgeon: Daljit Blanca MD;  Location: [facility];  Service: Orthopedics   •  VASECTOMY       Family History   Problem Relation Age of Onset   • No Known Problems Mother    • No Known Problems Father          Objective:  There were no vitals filed for this visit.  1    07/12/18  1051   Weight: 95.3 kg (210 lb)     Body mass index is 31.01 kg/m².       Ortho Exam  AP lateral of that right tibia shows complete consolidation at the fracture site at the fracture line is not visible.  Previous x-rays.  He is alert and oriented ×3.  The right knee shows no swelling effusion erythema she has full range of motion of 0-130° no instability no crepitus.  All wounds are completely benign.  Calf is nontender with a negative Homans.  Quad function is 5 over 5.  Skin is cool to touch.  No motor or sensory deficit.    Assessment:       1. Fracture    2. Pain    3. Closed displaced oblique fracture of shaft of right tibia with routine healing, subsequent encounter    4. Status post orthopedic surgery, follow-up exam          Plan:      over 10 minutes is spent reviewing the x-rays and physical findings with the patient.  He is released for full activity with no restrictions.  Return to see me in 6 weeks for final follow-up no x-ray necessary      Work Status:    LUKE query complete.    Orders:  Orders Placed This Encounter   Procedures   • XR Tibia Fibula 2 View Right       Medications:  No orders of the defined types were placed in this encounter.      Followup:  Return in about 6 weeks (around 8/23/2018).          Dragon transcription disclaimer     Much of this encounter note is an electronic transcription/translation of spoken language to printed text. The electronic translation of spoken language may permit erroneous, or at times, nonsensical words or phrases to be inadvertently transcribed. Although I have reviewed the note for such errors, some may still exist.

## 2018-07-23 ENCOUNTER — OFFICE VISIT (OUTPATIENT)
Dept: ORTHOPEDIC SURGERY | Facility: CLINIC | Age: 28
End: 2018-07-23

## 2018-07-23 DIAGNOSIS — Z09 STATUS POST ORTHOPEDIC SURGERY, FOLLOW-UP EXAM: ICD-10-CM

## 2018-07-23 DIAGNOSIS — R52 PAIN: Primary | ICD-10-CM

## 2018-07-23 DIAGNOSIS — S80.01XA CONTUSION OF RIGHT KNEE, INITIAL ENCOUNTER: ICD-10-CM

## 2018-07-23 DIAGNOSIS — S82.231D CLOSED DISPLACED OBLIQUE FRACTURE OF SHAFT OF RIGHT TIBIA WITH ROUTINE HEALING, SUBSEQUENT ENCOUNTER: ICD-10-CM

## 2018-07-23 PROCEDURE — 99213 OFFICE O/P EST LOW 20 MIN: CPT | Performed by: ORTHOPAEDIC SURGERY

## 2018-07-23 PROCEDURE — 73590 X-RAY EXAM OF LOWER LEG: CPT | Performed by: ORTHOPAEDIC SURGERY

## 2018-07-23 RX ORDER — IBUPROFEN 200 MG
200 TABLET ORAL EVERY 6 HOURS PRN
COMMUNITY
End: 2019-01-28

## 2018-07-23 NOTE — PROGRESS NOTES
Subjective: Status post IM rodding right tibia, contusion knee     Patient ID: Derick Amaya is a 27 y.o. male.    Chief Complaint:    History of Present Illness patient seen back early today after having fallen at home yesterday while playing with his children.  Having some soreness around the knee is some slight increased difficulty or pain on the distal tibia side of the previous fracture       Social History     Occupational History   • Not on file.     Social History Main Topics   • Smoking status: Never Smoker   • Smokeless tobacco: Never Used   • Alcohol use Yes      Comment: social drinker   • Drug use: No   • Sexual activity: Defer      Review of Systems   Constitutional: Negative for chills, diaphoresis, fever and unexpected weight change.   HENT: Negative for hearing loss, nosebleeds, sore throat and tinnitus.    Eyes: Negative for pain and visual disturbance.   Respiratory: Negative for cough, shortness of breath and wheezing.    Cardiovascular: Negative for chest pain and palpitations.   Gastrointestinal: Negative for abdominal pain, diarrhea, nausea and vomiting.   Endocrine: Negative for cold intolerance, heat intolerance and polydipsia.   Genitourinary: Negative for difficulty urinating, dysuria and hematuria.   Musculoskeletal: Negative for arthralgias, joint swelling and myalgias.   Skin: Negative for rash and wound.   Allergic/Immunologic: Negative for environmental allergies.   Neurological: Negative for dizziness, syncope and numbness.   Hematological: Does not bruise/bleed easily.   Psychiatric/Behavioral: Negative for dysphoric mood and sleep disturbance. The patient is not nervous/anxious.          No past medical history on file.  Past Surgical History:   Procedure Laterality Date   • APPENDECTOMY     • TIBIA IM NAILING/RODDING Right 4/8/2018    Procedure: TIBIA INTRAMEDULLARY NAIL/RAKESH INSERTION-alfonso;  Surgeon: Daljit Blanca MD;  Location: [facility];  Service: Orthopedics   • VASECTOMY        Family History   Problem Relation Age of Onset   • No Known Problems Mother    • No Known Problems Father          Objective:  There were no vitals filed for this visit.  There were no vitals filed for this visit.  There is no height or weight on file to calculate BMI.       Ortho Exam  AP and lateral view of the right tibia shows complete consolidation at the fracture site with no new injuries.  He is alert and oriented ×3.  There is an abrasion over the knee but there is no swelling effusion to the knee at full range of motion no instability no swelling to the distal leg slight tenderness.  No motor sensory deficit.  Active plantar and dorsiflexion against resistance is nontender.  His calf is nontender with a negative Homans quad function 5 over 5    Previous x-rays.    Assessment:       1. Pain    2. Closed displaced oblique fracture of shaft of right tibia with routine healing, subsequent encounter    3. Status post orthopedic surgery, follow-up exam    4. Contusion of right knee, initial encounter          Plan: Over 10 minutes was spent reviewing x-rays and physical findings and reassured the patient has no new injury.  Did recommend 2 Aleve twice a day for any residual soreness.  Keep scheduled appointment            Work Status:    LUKE query complete.    Orders:  Orders Placed This Encounter   Procedures   • XR Tibia Fibula 2 View Right       Medications:  No orders of the defined types were placed in this encounter.      Followup:  Return in about 2 weeks (around 8/6/2018).          Dragon transcription disclaimer     Much of this encounter note is an electronic transcription/translation of spoken language to printed text. The electronic translation of spoken language may permit erroneous, or at times, nonsensical words or phrases to be inadvertently transcribed. Although I have reviewed the note for such errors, some may still exist.

## 2019-01-14 ENCOUNTER — TELEPHONE (OUTPATIENT)
Dept: ORTHOPEDIC SURGERY | Facility: CLINIC | Age: 29
End: 2019-01-14

## 2019-01-14 NOTE — TELEPHONE ENCOUNTER
----- Message from Jacey bIanez sent at 1/14/2019  2:27 PM EST -----  Patients wife state that right leg is hurting. He had surgery with dr. Blanca in April 2018. I went ahead and scheduled for first available but wanted to see if he needed to be seen sooner.  Thanks.

## 2019-01-28 ENCOUNTER — OFFICE VISIT (OUTPATIENT)
Dept: ORTHOPEDIC SURGERY | Facility: CLINIC | Age: 29
End: 2019-01-28

## 2019-01-28 DIAGNOSIS — M76.51 PATELLAR TENDINITIS OF RIGHT KNEE: ICD-10-CM

## 2019-01-28 DIAGNOSIS — R52 PAIN: Primary | ICD-10-CM

## 2019-01-28 DIAGNOSIS — S82.231D CLOSED DISPLACED OBLIQUE FRACTURE OF SHAFT OF RIGHT TIBIA WITH ROUTINE HEALING, SUBSEQUENT ENCOUNTER: ICD-10-CM

## 2019-01-28 DIAGNOSIS — M86.9 PERIOSTITIS (HCC): ICD-10-CM

## 2019-01-28 DIAGNOSIS — Z09 STATUS POST ORTHOPEDIC SURGERY, FOLLOW-UP EXAM: ICD-10-CM

## 2019-01-28 PROCEDURE — 73590 X-RAY EXAM OF LOWER LEG: CPT | Performed by: ORTHOPAEDIC SURGERY

## 2019-01-28 PROCEDURE — 99213 OFFICE O/P EST LOW 20 MIN: CPT | Performed by: ORTHOPAEDIC SURGERY

## 2019-01-28 RX ORDER — MELOXICAM 15 MG/1
15 TABLET ORAL DAILY
Qty: 30 TABLET | Refills: 5 | Status: SHIPPED | OUTPATIENT
Start: 2019-01-28 | End: 2019-07-15 | Stop reason: SDUPTHER

## 2019-01-28 NOTE — PROGRESS NOTES
Subjective: Right leg pain     Patient ID: Derick Amaya is a 28 y.o. male.    Chief Complaint:    History of Present Illness 28-year-old male known to me having undergone intramedullary nailing of a right tibial shaft fracture in April 2018 presents evaluation of his right knee been symptomatic for approximately the past 2 months.  His noted some tenderness over the patella tendon and over the medial aspect of the tibial shaft.  No history of trauma.  Is taking occasional Aleve but has not noted any improvement of his symptoms.  Occasional discomfort does not interfere with work but is bothersome on a regular basis for the past couple months       Social History     Occupational History   • Not on file   Tobacco Use   • Smoking status: Never Smoker   • Smokeless tobacco: Never Used   Substance and Sexual Activity   • Alcohol use: Yes     Comment: social drinker   • Drug use: No   • Sexual activity: Defer      Review of Systems   Constitutional: Negative for chills, diaphoresis, fever and unexpected weight change.   HENT: Negative for hearing loss, nosebleeds, sore throat and tinnitus.    Eyes: Negative for pain and visual disturbance.   Respiratory: Negative for cough, shortness of breath and wheezing.    Cardiovascular: Negative for chest pain and palpitations.   Gastrointestinal: Negative for abdominal pain, diarrhea, nausea and vomiting.   Endocrine: Negative for cold intolerance, heat intolerance and polydipsia.   Genitourinary: Negative for difficulty urinating, dysuria and hematuria.   Musculoskeletal: Positive for arthralgias.   Skin: Negative for rash and wound.   Allergic/Immunologic: Negative for environmental allergies.   Neurological: Negative for dizziness, syncope and numbness.   Hematological: Does not bruise/bleed easily.   Psychiatric/Behavioral: Negative for dysphoric mood and sleep disturbance. The patient is not nervous/anxious.    All other systems reviewed and are negative.        No past  medical history on file.  Past Surgical History:   Procedure Laterality Date   • APPENDECTOMY     • TIBIA IM NAILING/RODDING Right 4/8/2018    Procedure: TIBIA INTRAMEDULLARY NAIL/RAKESH INSERTION-alfonso;  Surgeon: Daljit Blanca MD;  Location: [facility];  Service: Orthopedics   • VASECTOMY       Family History   Problem Relation Age of Onset   • No Known Problems Mother    • No Known Problems Father          Objective:  There were no vitals filed for this visit.  There were no vitals filed for this visit.  There is no height or weight on file to calculate BMI.        Ortho Exam    AP and lateral right tibia shows complete healing of the fracture.  Internal components are well-positioned.  No changes compared to previous x-rays as far as alignment.  He is alert and oriented ×3.  Is well-healed incisions about the right leg.  The knee has no swelling effusion erythema is no crepitus with range of motion no instability 0 90° no varus valgus instability.  Mild lateral joint line tenderness but negative Corinne's.  Tenderness palpation over the patella tendon with extension against resistance localized to the patella tendon.  The leg shows no swelling the skin is cool to touch his calf is nontender.  Mild tenderness palpation along the medial border of the tibia midportion.  Good distal pulses no motor sensory deficit good capillary refill.  Tolerating Aleve without any GI side effects.    Assessment:        1. Pain    2. Closed displaced oblique fracture of shaft of right tibia with routine healing, subsequent encounter    3. Status post orthopedic surgery, follow-up exam    4. Patellar tendinitis of right knee    5. Periostitis (CMS/HCC)           Plan: For 10 minutes was spent with patient face-to-face reviewing his x-ray findings and physical findings.  I believe he developed a mild case patella tendinitis and periostitis.  We'll place him on his therapeutic dosage of anti-inflammatory the form of meloxicam 15 mg a  day.  Return to see me in 4 weeks            Work Status:    LUKE query complete.    Orders:  Orders Placed This Encounter   Procedures   • XR Tibia Fibula 2 View Right       Medications:  New Medications Ordered This Visit   Medications   • meloxicam (MOBIC) 15 MG tablet     Sig: Take 1 tablet by mouth Daily.     Dispense:  30 tablet     Refill:  5       Followup:  Return in about 4 weeks (around 2/25/2019).          Dictated utilizing Dragon dictation

## 2019-07-15 RX ORDER — MELOXICAM 15 MG/1
TABLET ORAL
Qty: 30 TABLET | Refills: 5 | Status: SHIPPED | OUTPATIENT
Start: 2019-07-15

## 2021-02-16 ENCOUNTER — APPOINTMENT (OUTPATIENT)
Dept: SLEEP MEDICINE | Facility: HOSPITAL | Age: 31
End: 2021-02-16

## 2021-04-13 ENCOUNTER — APPOINTMENT (OUTPATIENT)
Dept: SLEEP MEDICINE | Facility: HOSPITAL | Age: 31
End: 2021-04-13

## 2021-10-07 ENCOUNTER — TELEPHONE (OUTPATIENT)
Dept: ORTHOPEDIC SURGERY | Facility: CLINIC | Age: 31
End: 2021-10-07

## 2021-10-07 NOTE — TELEPHONE ENCOUNTER
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  I SUGGESTED HE GO TO THE URGENT CARE IF PAIN IS WORSE AND CANNOT WAIT.

## 2021-10-07 NOTE — TELEPHONE ENCOUNTER
Provider: DR CESPEDES    Caller: ANGELIKA PIÑA    Relationship to Patient: WIFE (ON [facility])    Phone Number: [phone]    Reason for Call: PT IS IN A LOT OF PAIN IN RIGHT LEG, HAVING A LOT OF TROUBLE BEARING WEIGHT AND WALKING. PT HAS APPT ON 10/14 BUT HIS WIFE WANTS TO KNOW IF THERE WOULD BE A POSSIBILITY OF GETTING IN SOONER. PT'S WIFE SAID THAT HIS PCP BELIEVES THERE IS INFLAMMATION BUT NOTES THAT MELOXICAM IS NOT HELPING AT ALL. PT SAYS THAT THIS IS A LOT MORE PAINFUL THAN IT HAS BEEN IN THE PAST. PLEASE CALL HER BACK AT THE NUMBER ABOVE.    When was the patient last seen: JAN 2019

## 2021-10-20 ENCOUNTER — OFFICE VISIT (OUTPATIENT)
Dept: ORTHOPEDIC SURGERY | Facility: CLINIC | Age: 31
End: 2021-10-20

## 2021-10-20 VITALS — WEIGHT: 210 LBS | BODY MASS INDEX: 31.1 KG/M2 | HEIGHT: 69 IN

## 2021-10-20 DIAGNOSIS — M22.41 CHONDROMALACIA PATELLAE OF RIGHT KNEE: ICD-10-CM

## 2021-10-20 DIAGNOSIS — M79.604 RIGHT LEG PAIN: Primary | ICD-10-CM

## 2021-10-20 DIAGNOSIS — Z87.81 STATUS POST FRACTURE OF RIGHT TIBIA: ICD-10-CM

## 2021-10-20 PROCEDURE — 73590 X-RAY EXAM OF LOWER LEG: CPT | Performed by: ORTHOPAEDIC SURGERY

## 2021-10-20 PROCEDURE — 20610 DRAIN/INJ JOINT/BURSA W/O US: CPT | Performed by: ORTHOPAEDIC SURGERY

## 2021-10-20 PROCEDURE — 99213 OFFICE O/P EST LOW 20 MIN: CPT | Performed by: ORTHOPAEDIC SURGERY

## 2021-10-20 RX ORDER — LIDOCAINE HYDROCHLORIDE 10 MG/ML
4 INJECTION, SOLUTION EPIDURAL; INFILTRATION; INTRACAUDAL; PERINEURAL
Status: COMPLETED | OUTPATIENT
Start: 2021-10-20 | End: 2021-10-20

## 2021-10-20 RX ORDER — IBUPROFEN 600 MG/1
600 TABLET ORAL EVERY 6 HOURS PRN
COMMUNITY
End: 2022-07-24

## 2021-10-20 RX ORDER — TRIAMCINOLONE ACETONIDE 40 MG/ML
40 INJECTION, SUSPENSION INTRA-ARTICULAR; INTRAMUSCULAR
Status: COMPLETED | OUTPATIENT
Start: 2021-10-20 | End: 2021-10-20

## 2021-10-20 RX ADMIN — LIDOCAINE HYDROCHLORIDE 4 ML: 10 INJECTION, SOLUTION EPIDURAL; INFILTRATION; INTRACAUDAL; PERINEURAL at 15:55

## 2021-10-20 RX ADMIN — TRIAMCINOLONE ACETONIDE 40 MG: 40 INJECTION, SUSPENSION INTRA-ARTICULAR; INTRAMUSCULAR at 15:55

## 2021-10-20 NOTE — PROGRESS NOTES
Subjective: Right knee pain     Patient ID: Derick Amaya is a 31 y.o. male.    Chief Complaint:    History of Present Illness 31-year-old male known to me having undergone IM rodding of right tibia shaft fracture back in 2018 and had done well with minimal complaints of pain or discomfort from being last seen in 2019.  Does work at [employer] does a lot of walking apparently some climbing in the past 3 to 4 weeks started having some anterior knee pain with activity.  No new history of trauma.  The meloxicam he was previously taking he states seem to make the symptoms worse.       Social History     Occupational History   • Not on file   Tobacco Use   • Smoking status: Never Smoker   • Smokeless tobacco: Never Used   Vaping Use   • Vaping Use: Never used   Substance and Sexual Activity   • Alcohol use: Yes     Comment: social drinker   • Drug use: No   • Sexual activity: Defer      Review of Systems   Constitutional: Negative for chills, diaphoresis, fever and unexpected weight change.   HENT: Negative for hearing loss, nosebleeds, sore throat and tinnitus.    Eyes: Negative for pain and visual disturbance.   Respiratory: Negative for cough, shortness of breath and wheezing.    Cardiovascular: Negative for chest pain and palpitations.   Gastrointestinal: Negative for abdominal pain, diarrhea, nausea and vomiting.   Endocrine: Negative for cold intolerance, heat intolerance and polydipsia.   Genitourinary: Negative for difficulty urinating, dysuria and hematuria.   Musculoskeletal: Positive for arthralgias and myalgias. Negative for joint swelling.   Skin: Negative for rash and wound.   Allergic/Immunologic: Negative for environmental allergies.   Neurological: Negative for dizziness, syncope and numbness.   Hematological: Does not bruise/bleed easily.   Psychiatric/Behavioral: Negative for dysphoric mood and sleep disturbance. The patient is not nervous/anxious.          History reviewed. No pertinent past  medical history.  Past Surgical History:   Procedure Laterality Date   • APPENDECTOMY     • TIBIA IM NAILING/RODDING Right 4/8/2018    Procedure: TIBIA INTRAMEDULLARY NAIL/RAKESH INSERTION-alfonso;  Surgeon: Daljit Blanca MD;  Location: [facility];  Service: Orthopedics   • VASECTOMY       Family History   Problem Relation Age of Onset   • No Known Problems Mother    • No Known Problems Father          Objective:  There were no vitals filed for this visit.      10/20/21  1540   Weight: 95.3 kg (210 lb)     Body mass index is 31.01 kg/m².        Ortho Exam   AP lateral of the right hip is complete within normal limits.  The IM rakesh is in place in good position as are the locking screws the fracture line is not seen and compared to previous x-rays again there is complete consolidation and remodeling.  He is alert and oriented x3.  The knee is no swelling effusion erythema.  All wounds are benign.  He has 0 to 130 degrees of motion with no instability throughout the arc of motion.  Minimal patella pain.  No crepitus noted.  No tenderness to the patella tendon to palpation and there is no pain in the knee with flexion extension against resistance.  No joint line tenderness.  His calf is nontender.  Good distal pulses no motor or sensory deficit.    Assessment:        1. Right leg pain    2. Status post fracture of right tibia    3. Chondromalacia patellae of right knee           Plan: Reviewed the symptoms with the patient.  And his x-rays and physical exam.  Believe he might be developing some mild patellar chondromalacia.  After reviewing treatment options recumbency with a cortisone injection of lidocaine and Kenalog at a ratio 4:1 that was given to the superolateral portal after sterile prep and tolerated well.  Advised on 2 Aleve twice a day.  Return to see me as needed.  Was told that he still has pain after 4 weeks to call we will schedule an MRI.  Answered all questions  Large Joint Arthrocentesis: R knee  Date/Time:  10/20/2021 3:55 PM  Consent given by: patient  Site marked: site marked  Timeout: Immediately prior to procedure a time out was called to verify the correct patient, procedure, equipment, support staff and site/side marked as required   Supporting Documentation  Indications: pain   Procedure Details  Location: knee - R knee  Preparation: Patient was prepped and draped in the usual sterile fashion  Needle size: 22 G  Approach: superior (lateral)  Medications administered: 4 mL lidocaine PF 1% 1 %; 40 mg triamcinolone acetonide 40 MG/ML  Patient tolerance: patient tolerated the procedure well with no immediate complications                  Work Status:    LUKE query complete.    Orders:  Orders Placed This Encounter   Procedures   • Large Joint Arthrocentesis: R knee   • XR Tibia Fibula 2 View Right       Medications:  No orders of the defined types were placed in this encounter.      Followup:  Return if symptoms worsen or fail to improve.          Dictated utilizing Dragon dictation

## 2022-10-05 ENCOUNTER — APPOINTMENT (OUTPATIENT)
Dept: SLEEP MEDICINE | Facility: HOSPITAL | Age: 32
End: 2022-10-05

## 2024-10-16 NOTE — TELEPHONE ENCOUNTER
"Attempted to contact both the patient and the emergency contact unable to reach either with no voicemail set up. Per Dr. Blanca's last note 07.23.2018 \"Did recommend 2 Aleve twice a day for any residual soreness.  Keep scheduled appointment\" Patient did not keep his appt. 08.23.2018.    Appointment made for 01.28.2019.    Thanks.  " We discussed excessive alcohol intake  Monitor  Get Vit B12, folate levels  Screen for thyroid dis, anemia , liver dysfx  T/c Neuro ref, CT head  Add MVI, thiamine & folic acid

## (undated) DEVICE — BANDAGE,GAUZE,BULKEE II,4.5"X4.1YD,STRL: Brand: MEDLINE

## (undated) DEVICE — Device

## (undated) DEVICE — TOWEL,OR,DSP,ST,BLUE,STD,4/PK,20PK/CS: Brand: MEDLINE

## (undated) DEVICE — SUT VIC 2/0 CP2 CR8 18IN J762D

## (undated) DEVICE — BNDG ELAS MATRX V/CLS 4IN 5YD LF

## (undated) DEVICE — DRP C/ARM 41X74IN

## (undated) DEVICE — 3M™ IOBAN™ 2 ANTIMICROBIAL INCISE DRAPE 6650EZ: Brand: IOBAN™ 2

## (undated) DEVICE — DISPOSABLE TOURNIQUET CUFF SINGLE BLADDER, SINGLE PORT AND LUER LOCK CONNECTOR: Brand: COLOR CUFF

## (undated) DEVICE — SYS SKIN CLS DERMABOND PRINEO W/22CM MESH TP

## (undated) DEVICE — LOCKING SCREW, FULLY THREADED
Type: IMPLANTABLE DEVICE | Site: TIBIA | Status: NON-FUNCTIONAL
Removed: 2018-04-08

## (undated) DEVICE — BOWL PLSTC LG 32OZ BLU STRL

## (undated) DEVICE — DRILL, AO, STERILE

## (undated) DEVICE — DRAPE,U/ SHT,SPLIT,PLAS,STERIL: Brand: MEDLINE

## (undated) DEVICE — DRAPE,REIN 53X77,STERILE: Brand: MEDLINE

## (undated) DEVICE — KT DRN EVAC WND PVC 7F3/32IN 400CC

## (undated) DEVICE — BNDG ESMARK 6INX9FT STRL

## (undated) DEVICE — 3M™ IOBAN™ 2 ANTIMICROBIAL INCISE DRAPE 6640EZ: Brand: IOBAN™ 2

## (undated) DEVICE — GLV SURG SENSICARE ORTHO PF LF 8 STRL

## (undated) DEVICE — NDL SPINE 22G 5IN BLK

## (undated) DEVICE — SYR CONTRL LUERLOK 10CC

## (undated) DEVICE — REAMER SHAFT, MOD.TRINKLE: Brand: BIXCUT

## (undated) DEVICE — DRSNG PAD ABD 8X10IN STRL

## (undated) DEVICE — GLV SURG NEOLON 2G PF LF 8 STRL

## (undated) DEVICE — BNDG ELAS ELITE V/CLOSE 6IN 5YD LF STRL

## (undated) DEVICE — SPNG LAP 18X18IN LF STRL PK/5

## (undated) DEVICE — GUIDE WIRE, BALL-TIPPED, STERILE

## (undated) DEVICE — SPONGE,DRAIN,NONWVN,4"X4",6PLY,STRL,LF: Brand: MEDLINE

## (undated) DEVICE — TRY SKINPREP WET CHG 4PCT SPNG HIB

## (undated) DEVICE — SPNG GZ STRL 2S 4X4 12PLY

## (undated) DEVICE — IRRIGATOR BULB 60CC

## (undated) DEVICE — DRSNG TELFA PAD NONADH STR 1S 3X8IN

## (undated) DEVICE — APPL CHLORAPREP W/TINT 26ML ORNG

## (undated) DEVICE — PK BASIC ORTHO 90

## (undated) DEVICE — CVR HNDL LT SURG ACCSSRY BLU STRL

## (undated) DEVICE — SPLNT ORTHO 1STEP 5INX30IN

## (undated) DEVICE — PAD,ABDOMINAL,8"X10",ST,LF: Brand: MEDLINE

## (undated) DEVICE — STCKNT IMPERV 12IN STRL

## (undated) DEVICE — BNDG ELAS ELITE V/CLOSE 4IN 5YD LF STRL

## (undated) DEVICE — GAUZE,SPONGE,4"X4",16PLY,XRAY,STRL,LF: Brand: MEDLINE

## (undated) DEVICE — GLV SURG EUDERMIC PF LTX 8 STRL

## (undated) DEVICE — SUT VIC 0 0S6 CR3 18IN J754T

## (undated) DEVICE — SPNG GZ WOVN 4X4IN 12PLY 10/BX STRL